# Patient Record
Sex: FEMALE | Race: WHITE | Employment: OTHER | URBAN - NONMETROPOLITAN AREA
[De-identification: names, ages, dates, MRNs, and addresses within clinical notes are randomized per-mention and may not be internally consistent; named-entity substitution may affect disease eponyms.]

---

## 2018-03-26 ENCOUNTER — OFFICE VISIT (OUTPATIENT)
Dept: ENT CLINIC | Age: 83
End: 2018-03-26
Payer: MEDICARE

## 2018-03-26 VITALS
WEIGHT: 150 LBS | SYSTOLIC BLOOD PRESSURE: 130 MMHG | HEART RATE: 63 BPM | HEIGHT: 63 IN | DIASTOLIC BLOOD PRESSURE: 80 MMHG | OXYGEN SATURATION: 99 % | BODY MASS INDEX: 26.58 KG/M2

## 2018-03-26 DIAGNOSIS — H91.93 BILATERAL HEARING LOSS, UNSPECIFIED HEARING LOSS TYPE: ICD-10-CM

## 2018-03-26 DIAGNOSIS — H61.23 IMPACTED CERUMEN, BILATERAL: Primary | ICD-10-CM

## 2018-03-26 PROCEDURE — 69210 REMOVE IMPACTED EAR WAX UNI: CPT | Performed by: OTOLARYNGOLOGY

## 2018-03-26 ASSESSMENT — ENCOUNTER SYMPTOMS
EYES NEGATIVE: 1
ABDOMINAL PAIN: 0
GASTROINTESTINAL NEGATIVE: 1
COLOR CHANGE: 0
RESPIRATORY NEGATIVE: 1
SHORTNESS OF BREATH: 0

## 2018-06-04 ENCOUNTER — HOSPITAL ENCOUNTER (OUTPATIENT)
Age: 83
Discharge: HOME | End: 2018-06-06
Payer: MEDICARE

## 2018-06-04 ENCOUNTER — HOSPITAL ENCOUNTER (OUTPATIENT)
Dept: GENERAL RADIOLOGY | Age: 83
Discharge: HOME | End: 2018-06-06
Payer: MEDICARE

## 2018-06-04 DIAGNOSIS — R06.02 SHORTNESS OF BREATH: ICD-10-CM

## 2018-06-04 PROCEDURE — 71046 X-RAY EXAM CHEST 2 VIEWS: CPT

## 2018-07-24 ENCOUNTER — APPOINTMENT (OUTPATIENT)
Dept: CT IMAGING | Age: 83
DRG: 565 | End: 2018-07-24
Payer: MEDICARE

## 2018-07-24 ENCOUNTER — APPOINTMENT (OUTPATIENT)
Dept: GENERAL RADIOLOGY | Age: 83
DRG: 565 | End: 2018-07-24
Payer: MEDICARE

## 2018-07-24 ENCOUNTER — HOSPITAL ENCOUNTER (INPATIENT)
Age: 83
LOS: 2 days | Discharge: HOME HEALTH | DRG: 565 | End: 2018-07-27
Attending: EMERGENCY MEDICINE | Admitting: GENERAL PRACTICE
Payer: MEDICARE

## 2018-07-24 DIAGNOSIS — S09.90XA CLOSED HEAD INJURY, INITIAL ENCOUNTER: ICD-10-CM

## 2018-07-24 DIAGNOSIS — R77.8 ELEVATED TROPONIN: ICD-10-CM

## 2018-07-24 DIAGNOSIS — S20.20XA MULTIPLE CONTUSIONS OF TRUNK, INITIAL ENCOUNTER: Primary | ICD-10-CM

## 2018-07-24 LAB
ANION GAP SERPL CALCULATED.3IONS-SCNC: 14 MMOL/L (ref 7–16)
APTT: 27.8 SEC (ref 24.5–35.1)
BACTERIA: ABNORMAL /HPF
BASOPHILS ABSOLUTE: 0.03 E9/L (ref 0–0.2)
BASOPHILS RELATIVE PERCENT: 0.2 % (ref 0–2)
BILIRUBIN URINE: NEGATIVE
BLOOD, URINE: NEGATIVE
BUN BLDV-MCNC: 20 MG/DL (ref 8–23)
CALCIUM SERPL-MCNC: 9.8 MG/DL (ref 8.6–10.2)
CHLORIDE BLD-SCNC: 82 MMOL/L (ref 98–107)
CLARITY: ABNORMAL
CO2: 29 MMOL/L (ref 22–29)
COLOR: YELLOW
CREAT SERPL-MCNC: 1.1 MG/DL (ref 0.5–1)
EKG ATRIAL RATE: 89 BPM
EKG P AXIS: 75 DEGREES
EKG P-R INTERVAL: 204 MS
EKG Q-T INTERVAL: 348 MS
EKG QRS DURATION: 74 MS
EKG QTC CALCULATION (BAZETT): 423 MS
EKG R AXIS: 8 DEGREES
EKG T AXIS: 26 DEGREES
EKG VENTRICULAR RATE: 89 BPM
EOSINOPHILS ABSOLUTE: 0.03 E9/L (ref 0.05–0.5)
EOSINOPHILS RELATIVE PERCENT: 0.2 % (ref 0–6)
GFR AFRICAN AMERICAN: 56
GFR NON-AFRICAN AMERICAN: 47 ML/MIN/1.73
GLUCOSE BLD-MCNC: 155 MG/DL (ref 74–109)
GLUCOSE URINE: NEGATIVE MG/DL
HCT VFR BLD CALC: 33.2 % (ref 34–48)
HEMOGLOBIN: 12 G/DL (ref 11.5–15.5)
IMMATURE GRANULOCYTES #: 0.04 E9/L
IMMATURE GRANULOCYTES %: 0.3 % (ref 0–5)
INR BLD: 1.1
KETONES, URINE: NEGATIVE MG/DL
LEUKOCYTE ESTERASE, URINE: NEGATIVE
LYMPHOCYTES ABSOLUTE: 0.83 E9/L (ref 1.5–4)
LYMPHOCYTES RELATIVE PERCENT: 6.7 % (ref 20–42)
MCH RBC QN AUTO: 32.4 PG (ref 26–35)
MCHC RBC AUTO-ENTMCNC: 36.1 % (ref 32–34.5)
MCV RBC AUTO: 89.7 FL (ref 80–99.9)
MONOCYTES ABSOLUTE: 0.77 E9/L (ref 0.1–0.95)
MONOCYTES RELATIVE PERCENT: 6.2 % (ref 2–12)
NEUTROPHILS ABSOLUTE: 10.71 E9/L (ref 1.8–7.3)
NEUTROPHILS RELATIVE PERCENT: 86.4 % (ref 43–80)
NITRITE, URINE: POSITIVE
PDW BLD-RTO: 12.5 FL (ref 11.5–15)
PH UA: 6.5 (ref 5–9)
PLATELET # BLD: 197 E9/L (ref 130–450)
PMV BLD AUTO: 9.1 FL (ref 7–12)
POTASSIUM SERPL-SCNC: 3.9 MMOL/L (ref 3.5–5)
PROTEIN UA: NEGATIVE MG/DL
PROTHROMBIN TIME: 12.5 SEC (ref 9.3–12.4)
RBC # BLD: 3.7 E12/L (ref 3.5–5.5)
RBC UA: ABNORMAL /HPF (ref 0–2)
SODIUM BLD-SCNC: 125 MMOL/L (ref 132–146)
SPECIFIC GRAVITY UA: 1.01 (ref 1–1.03)
TOTAL CK: 265 U/L (ref 20–180)
TROPONIN: 0.08 NG/ML (ref 0–0.03)
UROBILINOGEN, URINE: 0.2 E.U./DL
WBC # BLD: 12.4 E9/L (ref 4.5–11.5)
WBC UA: ABNORMAL /HPF (ref 0–5)

## 2018-07-24 PROCEDURE — 71045 X-RAY EXAM CHEST 1 VIEW: CPT

## 2018-07-24 PROCEDURE — 85730 THROMBOPLASTIN TIME PARTIAL: CPT

## 2018-07-24 PROCEDURE — 80048 BASIC METABOLIC PNL TOTAL CA: CPT

## 2018-07-24 PROCEDURE — 85025 COMPLETE CBC W/AUTO DIFF WBC: CPT

## 2018-07-24 PROCEDURE — 2580000003 HC RX 258: Performed by: EMERGENCY MEDICINE

## 2018-07-24 PROCEDURE — 93005 ELECTROCARDIOGRAM TRACING: CPT | Performed by: EMERGENCY MEDICINE

## 2018-07-24 PROCEDURE — 85610 PROTHROMBIN TIME: CPT

## 2018-07-24 PROCEDURE — 6360000002 HC RX W HCPCS: Performed by: EMERGENCY MEDICINE

## 2018-07-24 PROCEDURE — 82550 ASSAY OF CK (CPK): CPT

## 2018-07-24 PROCEDURE — 70450 CT HEAD/BRAIN W/O DYE: CPT

## 2018-07-24 PROCEDURE — 81001 URINALYSIS AUTO W/SCOPE: CPT

## 2018-07-24 PROCEDURE — 90715 TDAP VACCINE 7 YRS/> IM: CPT | Performed by: EMERGENCY MEDICINE

## 2018-07-24 PROCEDURE — 6370000000 HC RX 637 (ALT 250 FOR IP): Performed by: EMERGENCY MEDICINE

## 2018-07-24 PROCEDURE — 84484 ASSAY OF TROPONIN QUANT: CPT

## 2018-07-24 PROCEDURE — 90471 IMMUNIZATION ADMIN: CPT | Performed by: EMERGENCY MEDICINE

## 2018-07-24 PROCEDURE — 99285 EMERGENCY DEPT VISIT HI MDM: CPT

## 2018-07-24 RX ORDER — SODIUM CHLORIDE 9 MG/ML
500 INJECTION, SOLUTION INTRAVENOUS CONTINUOUS
Status: DISCONTINUED | OUTPATIENT
Start: 2018-07-24 | End: 2018-07-25

## 2018-07-24 RX ORDER — ACETAMINOPHEN 500 MG
1000 TABLET ORAL ONCE
Status: COMPLETED | OUTPATIENT
Start: 2018-07-24 | End: 2018-07-24

## 2018-07-24 RX ADMIN — ACETAMINOPHEN 1000 MG: 500 TABLET ORAL at 22:08

## 2018-07-24 RX ADMIN — SODIUM CHLORIDE 500 ML: 9 INJECTION, SOLUTION INTRAVENOUS at 20:40

## 2018-07-24 RX ADMIN — ACETAMINOPHEN 1000 MG: 500 TABLET ORAL at 20:43

## 2018-07-24 RX ADMIN — TETANUS TOXOID, REDUCED DIPHTHERIA TOXOID AND ACELLULAR PERTUSSIS VACCINE, ADSORBED 0.5 ML: 5; 2.5; 8; 8; 2.5 SUSPENSION INTRAMUSCULAR at 22:09

## 2018-07-24 ASSESSMENT — PAIN SCALES - GENERAL
PAINLEVEL_OUTOF10: 2
PAINLEVEL_OUTOF10: 1

## 2018-07-25 PROBLEM — S20.20XA CONTUSION OF THORAX: Status: ACTIVE | Noted: 2018-07-25

## 2018-07-25 LAB
ANION GAP SERPL CALCULATED.3IONS-SCNC: 11 MMOL/L (ref 7–16)
BASOPHILS ABSOLUTE: 0.02 E9/L (ref 0–0.2)
BASOPHILS RELATIVE PERCENT: 0.4 % (ref 0–2)
BUN BLDV-MCNC: 21 MG/DL (ref 8–23)
CALCIUM SERPL-MCNC: 9 MG/DL (ref 8.6–10.2)
CHLORIDE BLD-SCNC: 88 MMOL/L (ref 98–107)
CK MB: 14.5 NG/ML (ref 0–4.3)
CO2: 27 MMOL/L (ref 22–29)
CREAT SERPL-MCNC: 1.1 MG/DL (ref 0.5–1)
EOSINOPHILS ABSOLUTE: 0.07 E9/L (ref 0.05–0.5)
EOSINOPHILS RELATIVE PERCENT: 1.3 % (ref 0–6)
GFR AFRICAN AMERICAN: 56
GFR NON-AFRICAN AMERICAN: 47 ML/MIN/1.73
GLUCOSE BLD-MCNC: 92 MG/DL (ref 74–109)
HCT VFR BLD CALC: 29.3 % (ref 34–48)
HEMOGLOBIN: 10.5 G/DL (ref 11.5–15.5)
IMMATURE GRANULOCYTES #: 0.01 E9/L
IMMATURE GRANULOCYTES %: 0.2 % (ref 0–5)
LYMPHOCYTES ABSOLUTE: 1.12 E9/L (ref 1.5–4)
LYMPHOCYTES RELATIVE PERCENT: 20.4 % (ref 20–42)
MAGNESIUM: 1.6 MG/DL (ref 1.6–2.6)
MCH RBC QN AUTO: 32 PG (ref 26–35)
MCHC RBC AUTO-ENTMCNC: 35.8 % (ref 32–34.5)
MCV RBC AUTO: 89.3 FL (ref 80–99.9)
MONOCYTES ABSOLUTE: 0.64 E9/L (ref 0.1–0.95)
MONOCYTES RELATIVE PERCENT: 11.7 % (ref 2–12)
NEUTROPHILS ABSOLUTE: 3.63 E9/L (ref 1.8–7.3)
NEUTROPHILS RELATIVE PERCENT: 66 % (ref 43–80)
PDW BLD-RTO: 12.5 FL (ref 11.5–15)
PLATELET # BLD: 169 E9/L (ref 130–450)
PMV BLD AUTO: 9.2 FL (ref 7–12)
POTASSIUM SERPL-SCNC: 3.4 MMOL/L (ref 3.5–5)
RBC # BLD: 3.28 E12/L (ref 3.5–5.5)
SODIUM BLD-SCNC: 126 MMOL/L (ref 132–146)
TOTAL CK: 309 U/L (ref 20–180)
TROPONIN: 0.06 NG/ML (ref 0–0.03)
WBC # BLD: 5.5 E9/L (ref 4.5–11.5)

## 2018-07-25 PROCEDURE — 6370000000 HC RX 637 (ALT 250 FOR IP): Performed by: INTERNAL MEDICINE

## 2018-07-25 PROCEDURE — 83735 ASSAY OF MAGNESIUM: CPT

## 2018-07-25 PROCEDURE — 97161 PT EVAL LOW COMPLEX 20 MIN: CPT

## 2018-07-25 PROCEDURE — 85025 COMPLETE CBC W/AUTO DIFF WBC: CPT

## 2018-07-25 PROCEDURE — 2580000003 HC RX 258: Performed by: INTERNAL MEDICINE

## 2018-07-25 PROCEDURE — G8988 SELF CARE GOAL STATUS: HCPCS

## 2018-07-25 PROCEDURE — 82553 CREATINE MB FRACTION: CPT

## 2018-07-25 PROCEDURE — 82550 ASSAY OF CK (CPK): CPT

## 2018-07-25 PROCEDURE — G8987 SELF CARE CURRENT STATUS: HCPCS

## 2018-07-25 PROCEDURE — 97165 OT EVAL LOW COMPLEX 30 MIN: CPT

## 2018-07-25 PROCEDURE — 93308 TTE F-UP OR LMTD: CPT

## 2018-07-25 PROCEDURE — 80048 BASIC METABOLIC PNL TOTAL CA: CPT

## 2018-07-25 PROCEDURE — 6360000002 HC RX W HCPCS: Performed by: NURSE PRACTITIONER

## 2018-07-25 PROCEDURE — 1200000000 HC SEMI PRIVATE

## 2018-07-25 PROCEDURE — 6370000000 HC RX 637 (ALT 250 FOR IP): Performed by: NURSE PRACTITIONER

## 2018-07-25 PROCEDURE — APPSS60 APP SPLIT SHARED TIME 46-60 MINUTES: Performed by: NURSE PRACTITIONER

## 2018-07-25 PROCEDURE — 36415 COLL VENOUS BLD VENIPUNCTURE: CPT

## 2018-07-25 PROCEDURE — 84484 ASSAY OF TROPONIN QUANT: CPT

## 2018-07-25 PROCEDURE — 99222 1ST HOSP IP/OBS MODERATE 55: CPT | Performed by: INTERNAL MEDICINE

## 2018-07-25 RX ORDER — SIMVASTATIN 40 MG
80 TABLET ORAL DAILY
Status: DISCONTINUED | OUTPATIENT
Start: 2018-07-25 | End: 2018-07-27 | Stop reason: HOSPADM

## 2018-07-25 RX ORDER — ESTRADIOL 0.5 MG/1
1 TABLET ORAL DAILY
Status: DISCONTINUED | OUTPATIENT
Start: 2018-07-25 | End: 2018-07-27 | Stop reason: HOSPADM

## 2018-07-25 RX ORDER — MAGNESIUM SULFATE IN WATER 40 MG/ML
2 INJECTION, SOLUTION INTRAVENOUS ONCE
Status: COMPLETED | OUTPATIENT
Start: 2018-07-25 | End: 2018-07-25

## 2018-07-25 RX ORDER — GABAPENTIN 300 MG/1
300 CAPSULE ORAL 2 TIMES DAILY
Status: DISCONTINUED | OUTPATIENT
Start: 2018-07-25 | End: 2018-07-27 | Stop reason: HOSPADM

## 2018-07-25 RX ORDER — AMITRIPTYLINE HYDROCHLORIDE 25 MG/1
25 TABLET, FILM COATED ORAL NIGHTLY
Status: DISCONTINUED | OUTPATIENT
Start: 2018-07-25 | End: 2018-07-27 | Stop reason: HOSPADM

## 2018-07-25 RX ORDER — LISINOPRIL AND HYDROCHLOROTHIAZIDE 25; 20 MG/1; MG/1
1 TABLET ORAL DAILY
Status: DISCONTINUED | OUTPATIENT
Start: 2018-07-25 | End: 2018-07-25 | Stop reason: CLARIF

## 2018-07-25 RX ORDER — SODIUM CHLORIDE 0.9 % (FLUSH) 0.9 %
10 SYRINGE (ML) INJECTION EVERY 12 HOURS SCHEDULED
Status: DISCONTINUED | OUTPATIENT
Start: 2018-07-25 | End: 2018-07-27 | Stop reason: HOSPADM

## 2018-07-25 RX ORDER — POTASSIUM CHLORIDE 20 MEQ/1
40 TABLET, EXTENDED RELEASE ORAL ONCE
Status: COMPLETED | OUTPATIENT
Start: 2018-07-25 | End: 2018-07-25

## 2018-07-25 RX ORDER — HYDROCHLOROTHIAZIDE 25 MG/1
25 TABLET ORAL DAILY
Status: DISCONTINUED | OUTPATIENT
Start: 2018-07-25 | End: 2018-07-25

## 2018-07-25 RX ORDER — COVID-19 ANTIGEN TEST
1 KIT MISCELLANEOUS 2 TIMES DAILY PRN
Status: DISCONTINUED | OUTPATIENT
Start: 2018-07-25 | End: 2018-07-25 | Stop reason: CLARIF

## 2018-07-25 RX ORDER — FUROSEMIDE 20 MG/1
20 TABLET ORAL DAILY
Status: DISCONTINUED | OUTPATIENT
Start: 2018-07-25 | End: 2018-07-25

## 2018-07-25 RX ORDER — NAPROXEN 250 MG/1
250 TABLET ORAL 2 TIMES DAILY PRN
Status: DISCONTINUED | OUTPATIENT
Start: 2018-07-25 | End: 2018-07-26

## 2018-07-25 RX ORDER — OMEPRAZOLE 20 MG/1
40 CAPSULE, DELAYED RELEASE ORAL DAILY
Status: DISCONTINUED | OUTPATIENT
Start: 2018-07-25 | End: 2018-07-25 | Stop reason: CLARIF

## 2018-07-25 RX ORDER — LISINOPRIL 20 MG/1
20 TABLET ORAL DAILY
Status: DISCONTINUED | OUTPATIENT
Start: 2018-07-25 | End: 2018-07-26

## 2018-07-25 RX ORDER — ACETAMINOPHEN 325 MG/1
650 TABLET ORAL EVERY 4 HOURS PRN
Status: DISCONTINUED | OUTPATIENT
Start: 2018-07-25 | End: 2018-07-27 | Stop reason: HOSPADM

## 2018-07-25 RX ORDER — PANTOPRAZOLE SODIUM 40 MG/1
40 TABLET, DELAYED RELEASE ORAL
Status: DISCONTINUED | OUTPATIENT
Start: 2018-07-25 | End: 2018-07-27 | Stop reason: HOSPADM

## 2018-07-25 RX ORDER — SODIUM CHLORIDE 0.9 % (FLUSH) 0.9 %
10 SYRINGE (ML) INJECTION PRN
Status: DISCONTINUED | OUTPATIENT
Start: 2018-07-25 | End: 2018-07-27 | Stop reason: HOSPADM

## 2018-07-25 RX ADMIN — PANTOPRAZOLE SODIUM 40 MG: 40 TABLET, DELAYED RELEASE ORAL at 06:58

## 2018-07-25 RX ADMIN — Medication 10 ML: at 08:45

## 2018-07-25 RX ADMIN — GABAPENTIN 300 MG: 300 CAPSULE ORAL at 20:25

## 2018-07-25 RX ADMIN — ESTRADIOL 1 MG: 0.5 TABLET ORAL at 08:44

## 2018-07-25 RX ADMIN — GABAPENTIN 300 MG: 300 CAPSULE ORAL at 08:44

## 2018-07-25 RX ADMIN — POTASSIUM CHLORIDE 40 MEQ: 20 TABLET, EXTENDED RELEASE ORAL at 08:44

## 2018-07-25 RX ADMIN — Medication 10 ML: at 20:26

## 2018-07-25 RX ADMIN — SIMVASTATIN 80 MG: 40 TABLET, FILM COATED ORAL at 20:25

## 2018-07-25 RX ADMIN — LISINOPRIL 20 MG: 20 TABLET ORAL at 08:44

## 2018-07-25 RX ADMIN — AMITRIPTYLINE HYDROCHLORIDE 25 MG: 25 TABLET, FILM COATED ORAL at 20:25

## 2018-07-25 RX ADMIN — MAGNESIUM SULFATE HEPTAHYDRATE 2 G: 40 INJECTION, SOLUTION INTRAVENOUS at 08:48

## 2018-07-25 RX ADMIN — HYDROCHLOROTHIAZIDE 25 MG: 25 TABLET ORAL at 08:44

## 2018-07-25 ASSESSMENT — PAIN SCALES - GENERAL
PAINLEVEL_OUTOF10: 0

## 2018-07-25 NOTE — PROGRESS NOTES
[facility] Quality Flow/Interdisciplinary Rounds Progress Note        Quality Flow Rounds held on July 25, 2018    Disciplines Attending:  Bedside Nurse, ,  and Nursing Unit Leadership    Helga Krishna was admitted on 7/24/2018  7:09 PM    Anticipated Discharge Date:  Expected Discharge Date: 07/26/18    Disposition:    Merritt Score:  Merritt Scale Score: 19    Readmission Risk              Risk of Unplanned Readmission:        7             Discussed patient goal for the day, patient clinical progression, and barriers to discharge.   The following Goal(s) of the Day/Commitment(s) have been identified:  Check cardio plan      Feliberto [place]  July 25, 2018

## 2018-07-25 NOTE — CARE COORDINATION
Social work / Discharge Planning:       Social work met with patient and her daughter at bedside for initial assessment. She lives alone in a one floor home but goes to the basement to do laundry. She uses a cane and walker as needed. Patient had home care in the past but cannot recall which one and has no history of ADARSH. AM PAC 17/24. Social work discussed the option of home care which she declines. Patient states that she plans to allow her daughter to do her laundry to eliminate the need for her to go to the basement. They denied having any discharge needs.   Electronically signed by TRISTIAN Fraga on 7/25/2018 at 1:54 PM

## 2018-07-25 NOTE — ED NOTES
Pt. Admission initiated. SBAR faxed to floor 6S. Floor called, spoke with ROSELINE Landeros, fax verified. Awaiting transport with tele, will continue to monitor pt.       Jermaine Araiza RN  07/25/18 [id number]

## 2018-07-25 NOTE — PROGRESS NOTES
her house. Patient education  Pt educated on walker safety    Patient response to education:   Pt verbalized understanding Pt demonstrated skill Pt requires further education in this area   yes With cues yes     Rehab potential is good for reaching above PT goals. Pts/ family goals   1. To return home    Patient and or family understand(s) diagnosis, prognosis, and plan of care. PLAN  PT care will be provided in accordance with the objectives noted above. Whenever appropriate, clear delegation orders will be provided for nursing staff. Exercises and functional mobility practice will be used as well as appropriate assistive devices or modalities to obtain goals. Patient and family education will also be administered as needed. Frequency of treatments will be 2-5x/week x 5 days.     Юлия PT  [id number]

## 2018-07-25 NOTE — ED NOTES
Alterations in skin    Location: coccyx    Location description: upper          Wound description: Redness, skin tear, bruising      Alterations in skin    Location: elbow    Location description: left        Wound description: Skin tear from fall.      Jose Pollock RN  07/24/18 [address] [place] [address] Escobar Ventura RN  07/25/18 [id number]

## 2018-07-25 NOTE — H&P
History and Physical      CHIEF COMPLAINT:  Mechanical fall      HISTORY OF PRESENT ILLNESS:      The patient is a 80 y.o. female patient of Dr. Quintin Glover who presents with a mechanical fall from home. She was in her basement doing laundry. When she came up the steps she missed the landing, lost her balance and fell backward down the steps and hit her head. She denies any loss of consciousness. She was on the ground for approximately one hour. She called her daughter and paramedics were called and brought her to the emergency room. She admits to having an unsteady gait. She denies any chest pain. She denies any palpitations or lightheadedness or vertigo. She admits to having chronic exertional dyspnea which is unchanged. She states she had a contusion on the back of her head however this is improved. Past Medical History:    Past Medical History:   Diagnosis Date    GERD (gastroesophageal reflux disease)     Hyperlipidemia     Hypertension        Past Surgical History:    Past Surgical History:   Procedure Laterality Date    BLADDER REPAIR      BREAST LUMPECTOMY      SKIN CANCER EXCISION      head/face areas       Medications Prior to Admission:    Prescriptions Prior to Admission: linaclotide (LINZESS) 145 MCG capsule, Take 145 mcg by mouth every morning (before breakfast)  Naproxen Sodium (ALEVE) 220 MG CAPS, Take by mouth 2 times daily  furosemide (LASIX) 20 MG tablet, Take 20 mg by mouth daily  lisinopril-hydrochlorothiazide (PRINZIDE;ZESTORETIC) 20-25 MG per tablet, Take 1 tablet by mouth daily. estradiol (ESTRACE) 1 MG tablet, Take 1 mg by mouth daily. simvastatin (ZOCOR) 40 MG tablet, Take 80 mg by mouth daily   gabapentin (NEURONTIN) 300 MG capsule, Take 300 mg by mouth 2 times daily. omeprazole (PRILOSEC) 20 MG capsule, Take 40 mg by mouth daily. amitriptyline (ELAVIL) 25 MG tablet, Take 25 mg by mouth nightly.       Allergies:    Codeine    Social History:    reports that she has quit smoking. Her smoking use included Cigarettes. She quit after 2.00 years of use. She has never used smokeless tobacco. She reports that she does not drink alcohol or use drugs. Family History: Mother  of breast cancer    REVIEW OF SYSTEMS    Constitutional: negative for chills, fevers and weight loss  Eyes: negative for icterus and redness  Ears, nose, mouth, throat, and face: negative for epistaxis, hearing loss, nasal congestion, sore mouth, sore throat and tinnitus  Respiratory: negative for cough and hemoptysis  Cardiovascular: negative for chest pain, chest pressure/discomfort, exertional chest pressure/discomfort, irregular heart beat, lower extremity edema, near-syncope, palpitations, paroxysmal nocturnal dyspnea and syncope  Gastrointestinal: negative for abdominal pain and vomiting  Genitourinary:negative for dysuria and frequency  Integument/breast: negative for pruritus and rash  Hematologic/lymphatic: negative for bleeding and easy bruising  Musculoskeletal:negative for arthralgias and muscle weakness  Neurological: negative for coordination problems and dizziness  Behavioral/Psych: negative for anxiety and depression  Endocrine: negative for temperature intolerance  Allergic/Immunologic: negative for anaphylaxis and angioedema    PHYSICAL EXAM:    Vitals:  BP (!) 143/65   Pulse 72   Temp 98 °F (36.7 °C) (Oral)   Resp 14   Ht 5' 5\" (1.651 m)   Wt 138 lb 6.4 oz (62.8 kg)   SpO2 97%   BMI 23.03 kg/m²     General appearance: alert, appears stated age and cooperative  Head: Normocephalic, without obvious abnormality, atraumatic  Eyes: conjunctivae/corneas clear. PERRL, EOM's intact. Fundi benign. Ears: normal TM's and external ear canals both ears  Nose: Nares normal. Septum midline. Mucosa normal. No drainage or sinus tenderness.   Throat: lips, mucosa, and tongue normal; teeth and gums normal  Neck: no adenopathy, no carotid bruit, no JVD, supple, symmetrical, trachea midline and thyroid Range 2018 20:35   Prothrombin Time Latest Ref Range: 9.3 - 12.4 sec 12.5 (H)   INR Unknown 1.1   aPTT Latest Ref Range: 24.5 - 35.1 sec 27.8     Narrative   Patient MRN:  [id number]   : 3/13/1927   Age: 80 years   Gender: Female       Order Date:  2018 11:15 PM               TECHNIQUE/NUMBER OF IMAGES/COMPARISON/CLINICAL HISTORY:       Chest AP       1 imaging, one view       Comparison .       Fall trauma injury.       FINDINGS: There is a large-sized hiatal hernia projected the right   lung. Some areas of chronic atelectasis in the right base.       Cardiac area has upper borderline size.       No pneumothorax identified. No signs for acute pulmonary contusion. Some discrete atelectasis are also seen in the left base.  There is no   perihilar vascular congestion.           Impression   No acute cardiopulmonary process       2018  9:01 PM - Fabiano, Mhy Incoming Ekg Results From [facility]     Component Results     Component Value Ref Range & Units Status Collected Lab   Ventricular Rate 89  BPM Incomplete 2018  7:19 PM [facility]   Atrial Rate 89  BPM Incomplete 2018  7:19 PM [facility]   P-R Interval 204  ms Incomplete 2018  7:19 PM [facility]   QRS Duration 74  ms Incomplete 2018  7:19 PM [facility]   Q-T Interval 348  ms Incomplete 2018  7:19 PM [facility]   QTc Calculation (Bazett) 423  ms Incomplete 2018  7:19 PM [facility]   P [place] 75  degrees Incomplete 2018  7:19 PM [facility]   R Axis 8  degrees Incomplete 2018  7:19 PM [facility]   T [place] 26  degrees Incomplete 2018  7:19 PM [facility]   Testing Performed By     Lab - [address] Name Director Address Valid Date Range   360-[facility] MUSE Unknown Unknown 16 1121-Present   Narrative     Normal sinus rhythm  Normal ECG  No previous ECGs available     Narrative   EXAM:     CT Head Without Intravenous Contrast       EXAM DATE/TIME:     2018 9:30 PM       CLINICAL HISTORY:     80 years old, female; Injury or trauma; Fall; Initial encounter; Sprain or    strain and swelling (edema); Additional info: Fall, head injury       TECHNIQUE:     Axial computed tomography images of the head/brain without intravenous    contrast.  All CT scans at this facility use at least one of these dose    optimization techniques: automated exposure control; mA and/or kV adjustment    per patient size (includes targeted exams where dose is matched to clinical    indication); or iterative reconstruction.       COMPARISON:     No relevant prior studies available.       FINDINGS:     Brain:  Brain atrophy with hypodensities in the periventricular/deep white    matter that suggest chronic microvascular ischemia.  No hemorrhage.     Ventricles: Monica Chiles.  No hydrocephalus.     Bones/joints:  Unremarkable.  No acute fracture.     Soft tissues:  Scalp hematoma at the vertex.     Vasculature:  Vascular calcifications.     Sinuses:  Unremarkable as visualized.  No acute sinusitis.     Mastoid air cells:  Unremarkable as visualized.  No mastoid effusion.           Impression     No acute intracranial abnormality.       This report has been electronically signed by Maricarmen Torres MD.           Problem list:    Patient Active Problem List   Diagnosis    Elevated troponin    Multiple contusions of trunk, initial encounter         ASSESSMENT:      1. Mechanical fall    2. Traumatic rhabdomyolysis secondary to mechanical fall with rlevated troponin, being treated with intravenous fluids    3. Hyponatremia and hypokalemia, likely diuretic induced    4. Essential hypertension    5. Hyperlipidemia      PLAN:     1. Cardiology evaluation progress    2. Discontinue diuretics    3. Echocardiogram for LV function    4. Continue lisinopril    5.  Continue IV fluids    Everardo Sicard D.O., JAMSHID  9:18 AM  7/25/2018

## 2018-07-25 NOTE — PLAN OF CARE
Problem: Falls - Risk of:  Goal: Will remain free from falls  Will remain free from falls   Outcome: Met This Shift      Problem: ABCDS Injury Assessment  Goal: Absence of physical injury  Outcome: Met This Shift      Problem: Cardiac Output - Decreased:  Goal: Hemodynamic stability will improve  Hemodynamic stability will improve   Outcome: Met This Shift      Problem: Skin Integrity:  Goal: Will show no infection signs and symptoms  Will show no infection signs and symptoms   Outcome: Met This Shift

## 2018-07-25 NOTE — CONSULTS
Inpatient Cardiology Consultation      Reason for Consult:  Elevated troponin    Consulting Physician: Dr. Erica Mccullough    Requesting Physician:  Dr. Connor Lanza    Date of Consultation: 7/25/2018    HISTORY OF PRESENT ILLNESS: Ms. Bindu Baer is a 80year old female who is previously not known to [facility]. PMH: HTN, HLD, GERD, [place], chronic back pain, Ex-smoker, and recurrent mechanical falls. Prior to Ms. Devi presenting to [facility] on 7/24/2018 she reports having a vague \"pain\" in her lower back for years that limits her activity in the morning. She reports needing to \"move slowly in the morning. \" Shortly after waking up she was able to do her normal activities around the house. In the evening she proceeded to go down ~10-12 steps into the basement to wash clothes. When she was walking back up the steps she got to the top and used the door knob to \"pull herself\" up to the next step and she lost her balance falling backwards down 10-12 steps and landing on the ground hitting the back of her head. She denies any precipitating symptoms prior to the fall and she remembers hitting the ground with no LOC. Denies CP, palpitations, dizziness, or feeling lightheaded. She was down on the ground for ~ 1 hour and was unable to get herself up. She called her daughter and EMS was summoned. She reports having Chronic QUEZADA walking up/down steps and walking > 75 ft (no worse over the past 2 years). Upon arrival to the ED: Labs included Na+ 125, K+ 3.9, BUN/Cr 20/1.1, troponin 0.08, Total , (second troponin 0.06), WBC 12.4, H/H stable. UA: + Nitrites. CT Head: No acute process. CXR: No Acute process. Medications in the ED: Tylenol, Tetanus injection, NaCl IV fluids. She was admitted to a telemetry monitored unit. Cardiology was consulted for elevated troponin. Upon initial consultation, patient is sitting up in bed and appears to be in no acute Distress.  Reports having \"soreness\" in the back of her head Current Facility-Administered Medications: sodium chloride flush 0.9 % injection 10 mL, 10 mL, Intravenous, 2 times per day  sodium chloride flush 0.9 % injection 10 mL, 10 mL, Intravenous, PRN  acetaminophen (TYLENOL) tablet 650 mg, 650 mg, Oral, Q4H PRN  amitriptyline (ELAVIL) tablet 25 mg, 25 mg, Oral, Nightly  estradiol (ESTRACE) tablet 1 mg, 1 mg, Oral, Daily  furosemide (LASIX) tablet 20 mg, 20 mg, Oral, Daily  gabapentin (NEURONTIN) capsule 300 mg, 300 mg, Oral, BID  linaclotide (LINZESS) capsule 145 mcg, 145 mcg, Oral, QAM AC  Naproxen Sodium CAPS 1 capsule, 1 capsule, Oral, BID PRN  simvastatin (ZOCOR) tablet 80 mg, 80 mg, Oral, Daily  lisinopril (PRINIVIL;ZESTRIL) tablet 20 mg, 20 mg, Oral, Daily **AND** hydrochlorothiazide (HYDRODIURIL) tablet 25 mg, 25 mg, Oral, Daily  pantoprazole (PROTONIX) tablet 40 mg, 40 mg, Oral, QAM AC  0.9 % sodium chloride infusion, 500 mL, Intravenous, Continuous    Allergies:  Codeine    Social History: Former smoker: quit 20-30 years ago; 0.5 PPD for \"around 15 years\"  Denies alcohol and illicit drug use. Uses a walker. Lives alone in 2 story home with basement. Daughter comes to her house daily to help with cooking and cleaning. Drinks 1 cup of coffee per day and 1 cup of tea. Family History: Noncontributory due to advanced age. REVIEW OF SYSTEMS:     · Constitutional: + fatigue. Denies fevers, chills or night sweats. +[place]  · Eyes: Denies visual changes or drainage  · ENT: Denies headaches or hearing loss. No mouth sores or sore throat. No epistaxis   · Cardiovascular: Denies chest pain, pressure or palpitations. + \"occasional\" lower extremity swelling-wears MARY hoses. · Respiratory: + QUEZADA. Denies  cough, orthopnea or PND. No hemoptysis   · Gastrointestinal: Denies hematemesis or anorexia. No hematochezia or melena    · Genitourinary: Denies urgency, dysuria or hematuria.   · Musculoskeletal: Denies gait disturbance (uses a walker),+ generalized pain conjunctival erythema  ENMT: MMM, no rhinorrhea  Neck: Supple, no carotid bruits  Lungs: Clear to auscultation bilaterally. No wheezes, rales, or rhonchi. Cardiac: Regular rate and rhythm, 1/6 systolic murmur  Abdomen: Soft, +bowel sounds  Extremities: Moves all extremities x 4, trace lower extremity edema  Neurologic: No focal motor deficits apparent, normal mood and affect    Laboratory Tests:  Lab Results   Component Value Date    WBC 5.5 07/25/2018    HGB 10.5 (L) 07/25/2018    HCT 29.3 (L) 07/25/2018    MCV 89.3 07/25/2018     07/25/2018     Lab Results   Component Value Date    CREATININE 1.1 (H) 07/25/2018    BUN 21 07/25/2018     (L) 07/25/2018    K 3.4 (L) 07/25/2018    CL 88 (L) 07/25/2018    CO2 27 07/25/2018     Lab Results   Component Value Date    MG 1.6 07/25/2018     Lab Results   Component Value Date    CKTOTAL 309 (H) 07/25/2018    CKMB 14.5 (H) 07/25/2018    TROPONINI 0.06 (H) 07/25/2018    TROPONINI 0.08 (H) 07/24/2018     Telemetry findings reviewed: SR at rate 70's      Impression:  1. Presentation for mechanical fall (no syncope)  2. Elevated cardiac enzymes -- patient denies recent CP or worsening SOB, no acute STT changes on EKG  3. HTN  4. HLD  5. Hypokalemia / low magnesium  6. Prior tobacco abuse    Plan:  1. Echocardiogram  2. Replace electrolytes  3. No advanced cardiac work-up planned otherwise    Thank you for allowing me to participate in your patient's care. Please feel free to contact me if you have any questions or concerns.     Lisseth Naqvi MD  [facility]) Cardiology

## 2018-07-25 NOTE — ED PROVIDER NOTES
Department of Emergency Medicine   ED  Provider Note  Admit Date/RoomTime: 7/24/2018  7:09 PM  ED Room: 11/11      History of Present Illness:  7/24/18, Time: 9:28 PM        Nader Matias is a 80 y.o. female presenting to the ED for fall, beginning today. The complaint has been constant, moderate in severity, and worsened by nothing. Patient arrived in the ED after falling down 10 steps earlier today. Family states that the patient got to the top of the steps after doing laundry and fell. Family reports that the steps were wooden. Pt complains of pain in the back of her head and neck pain. Pt denies cough, congestion, fever, chills, N/V/D, HA, CP, SOB, abdominal pain, back pain, LOC, syncope, urinary symptoms, constipation, or any other symptoms. Review of Systems:   Pertinent positives and negatives are stated within HPI, all other systems reviewed and are negative.    --------------------------------------------- PAST HISTORY ---------------------------------------------  Past Medical History:  has a past medical history of GERD (gastroesophageal reflux disease); Hyperlipidemia; and Hypertension. Past Surgical History:  has a past surgical history that includes bladder repair; Breast lumpectomy; and Skin cancer excision. Social History:  reports that she has quit smoking. Her smoking use included Cigarettes. She quit after 2.00 years of use. She has never used smokeless tobacco. She reports that she does not drink alcohol or use drugs. Family History: Family history is unknown by patient. The patients home medications have been reviewed. Allergies: Codeine      ---------------------------------------------------PHYSICAL EXAM--------------------------------------    Constitutional/General: Alert and oriented x3, well appearing, non toxic in NAD  Head: hematoma to the back of the scalp, tenderness on palpation. Eyes: PERRL, EOMI.   Mouth: Oropharynx clear, mucous membranes moist. 0. 77 0.10 - 0.95 E9/L    Eosinophils # 0.03 (L) 0.05 - 0.50 E9/L    Basophils # 0.03 0.00 - 0.20 C6/T   Basic Metabolic Panel   Result Value Ref Range    Sodium 125 (L) 132 - 146 mmol/L    Potassium 3.9 3.5 - 5.0 mmol/L    Chloride 82 (L) 98 - 107 mmol/L    CO2 29 22 - 29 mmol/L    Anion Gap 14 7 - 16 mmol/L    Glucose 155 (H) 74 - 109 mg/dL    BUN 20 8 - 23 mg/dL    CREATININE 1.1 (H) 0.5 - 1.0 mg/dL    GFR Non-African American 47 >=60 mL/min/1.73    GFR African American 56     Calcium 9.8 8.6 - 10.2 mg/dL   Urinalysis   Result Value Ref Range    Color, UA Yellow Straw/Yellow    Clarity, UA SL CLOUDY Clear    Glucose, Ur Negative Negative mg/dL    Bilirubin Urine Negative Negative    Ketones, Urine Negative Negative mg/dL    Specific Gravity, UA 1.010 1.005 - 1.030    Blood, Urine Negative Negative    pH, UA 6.5 5.0 - 9.0    Protein, UA Negative Negative mg/dL    Urobilinogen, Urine 0.2 <2.0 E.U./dL    Nitrite, Urine POSITIVE (A) Negative    Leukocyte Esterase, Urine Negative Negative   CK   Result Value Ref Range    Total  (H) 20 - 180 U/L   Microscopic Urinalysis   Result Value Ref Range    WBC, UA NONE 0 - 5 /HPF    RBC, UA NONE 0 - 2 /HPF    Bacteria, UA MANY (A) /HPF   EKG 12 Lead   Result Value Ref Range    Ventricular Rate 89 BPM    Atrial Rate 89 BPM    P-R Interval 204 ms    QRS Duration 74 ms    Q-T Interval 348 ms    QTc Calculation (Bazett) 423 ms    P Axis 75 degrees    R Axis 8 degrees    T Axis 26 degrees       RADIOLOGY:  Interpreted by Radiologist.  XR CHEST PORTABLE   Final Result   No acute cardiopulmonary process      CT HEAD WO CONTRAST   Final Result     No acute intracranial abnormality. This report has been electronically signed by Tarah Jane MD.          EKG Interpretation    EKG: This EKG is signed and interpreted by the EP.     Time: 1919  Rate: 89 bpm  Rhythm: Sinus  Interpretation: non-specific EKG  Comparison: none      ------------------------- NURSING NOTES AND VITALS REVIEWED ---------------------------   The nursing notes within the ED encounter and vital signs as below have been reviewed by myself. /62   Pulse 72   Temp 97.6 °F (36.4 °C) (Oral)   Resp 16   Ht 5' 5\" (1.651 m)   Wt 150 lb (68 kg)   SpO2 98%   BMI 24.96 kg/m²   Oxygen Saturation Interpretation: Normal    The patients available past medical records and past encounters were reviewed. ------------------------------ ED COURSE/MEDICAL DECISION MAKING----------------------  Medications   0.9 % sodium chloride infusion (500 mLs Intravenous New Bag 7/24/18 2040)   acetaminophen (TYLENOL) tablet 1,000 mg (1,000 mg Oral Given 7/24/18 2043)   acetaminophen (TYLENOL) tablet 1,000 mg (1,000 mg Oral Given 7/24/18 2208)   Tetanus-Diphth-Acell Pertussis (BOOSTRIX) injection 0.5 mL (0.5 mLs Intramuscular Given 7/24/18 2209)       Medical Decision Making:   Differential Diagnoses:  Closed Head Injury, SAH, SDH, Cervical Fracture, Dehydration, UTI, Metabolic Disorder, to name a few. Re-Evaluations:           Re-evaluation. Patients symptoms are improving    Consultations:  Dr. Ministerio Mijares was consulted for admission to [facility]. Counseling: The emergency provider has spoken with the patient and family member patient and discussed todays results, in addition to providing specific details for the plan of care and counseling regarding the diagnosis and prognosis. Questions are answered at this time and they are agreeable with the plan.     --------------------------------- IMPRESSION AND DISPOSITION ---------------------------------    IMPRESSION  1. Multiple contusions of trunk, initial encounter    2. Closed head injury, initial encounter    3.  Elevated troponin        DISPOSITION  Disposition: Admit to telemetry  Patient condition is stable    7/24/18, 9:28 PM.    This note is prepared by Karely Faustin, acting as Scribe for Corrina Erwin MD.    Corrina Erwin MD:  The scribe's documentation has been prepared under my direction and personally reviewed by me in its entirety. I confirm that the note above accurately reflects all work, treatment, procedures, and medical decision making performed by me.              Jose Carlos Arriaza MD  07/24/18 [id number]

## 2018-07-25 NOTE — PROGRESS NOTES
Occupational Therapy  Occupational Therapy Initial Evaluation  Date: 2018  Patient Name: Deuce Cuello  MRN: [id number]  : 3/13/1927  Room #: 4854/9653-P    Evaluating OT: Goldie Dawson, OTR/L    Chart review completed and nursing cleared patient to participate in edge/out of bed activities; patient agreeable to participate in OT evaluation. Frequency / Duration: OT treatment to be provided 2-5x/week (M-F), as able/appropriate. Recommended AE/DME: Continue to assess. Diagnosis: Patient admitted with diagnosis of: Elevated troponin [R74.8]. Patient presented to hospital s/p fall at home. Patient's medical history includes: HTN, GERD, cancer. Diagnosis / Problem List:   1. Multiple contusions of trunk, initial encounter    2. Closed head injury, initial encounter    3. Elevated troponin       Patient Active Problem List   Diagnosis    Elevated troponin    Multiple contusions of trunk, initial encounter    Electrolyte abnormality    Essential hypertension    Mixed hyperlipidemia     Patient's Past Medical History:   Past Medical History:   Diagnosis Date    GERD (gastroesophageal reflux disease)     Hyperlipidemia     Hypertension      Patient's Surgical History:   Past Surgical History:   Procedure Laterality Date    BLADDER REPAIR      BREAST LUMPECTOMY      SKIN CANCER EXCISION      head/face areas       Precautions: falls risk; bed/chair alarms; hearing impairment     Home Setup: Patient reported that she lives alone in a one-floor home (one step to enter); patient's bedroom and bathroom are on the main living level. Patient has a tub only (with tub seat available). Patient noted that the laundry is in the basement of her home. Equipment (AE/DME/AD) Owned: walker; cane; tub seat    Prior Level of Function (PLOF): (per patient)   ADLs: Independent   IADLs: Independent (with light meal prep tasks and laundry);  Needed assistance (with most other IADLs)   Functional Transfers/Mobility: Independent (with use of cane most of the time) - patient noted that a walker will not fit well within all of the rooms of her home  Family/Caregiver Assistance Available: Patient indicated that her daughter assists with IADLs, as needed; patient noted that her daughter stops by daily to check on her. Patient stated that her daughter can assist with laundry, as needed. Social History: (per patient)   Driving: No    Pain Level: Patient reported feeling \"sore\"; patient did not rate her pain. Cognition: Patient alert and oriented grossly. Safety Awareness: Fair    Vision/Perception:   Hearing: Impaired  Vision: WFL grossly       Occupational/Functional Performance:   Current Status:  07/25/18  Comments:   Self-Feeding: Independent    Grooming: SBA SBA for completion of simple grooming tasks while standing at sink; cues given to maximize safety with management of walker. Upper Body Dressing: SBA    Lower Body Dressing: Mod A    Bathing: Mod A    Toileting: Min A    Bed Mobility: Not assessed. Functional Transfers: Sit-to-Stand: CGA Cues needed to maximize safety with functional transfers. Functional Mobility: Min A - Mod A for functional mobility with use of cane; CGA for use of walker within patient's bathroom, room, and hallway Cues needed to maximize safety with management of walker.      AM-[facility] Inpatient Daily Activity Raw Score: 17  AM-PAC Daily Activity Inpatient   How much help for putting on and taking off regular lower body clothing?: A Lot  How much help for Bathing?: A Lot  How much help for Toileting?: A Little  How much help for putting on and taking off regular upper body clothing?: A Little  How much help for taking care of personal grooming?: A Little  How much help for eating meals?: None  AM-[facility] Inpatient Daily Activity Raw Score: 17  AM-PAC Inpatient ADL T-Scale Score : 37.26  ADL Inpatient CMS 0-100% Score: 50.11  ADL Inpatient CMS G-Code Modifier : CK      Sitting OT treatment to be provided for ADL re-training, neuromuscular re-education, functional transfer/mobility training, cognitive re-training, equipment needs, energy conservation techniques, patient and/or family education/training, environmental modifications, compensatory techniques for ADL performance, and any splinting/positioning needs, all as needed. When appropriate, clear delegation orders will be provided for nursing staff. Goals:  1. Patient will perform grooming tasks with modified independence while standing at sink within 1 week. 2. Patient will perform upper body dressing/bathing tasks with modified independence, including item retrieval, within 1 week. 3. Patient will demonstrate 4+/5 BUE strength within 2 weeks in order to maximize independence with ADLs. 4. Patient will perform lower body dressing/bathing with modified independence, including item retrieval, within 2 weeks. 5. Patient will perform all aspects of toileting with modified independence within 2 weeks. 6. Patient will perform functional transfers with modified independence within 2 weeks in order to maximize independence with ADLs. 7. Patient will perform functional mobility, using AD as needed, with modified independence within 2 weeks in order to maximize independence with ADLs. 8. Patient will demonstrate Good understanding and consistent implementation of energy conservation techniques and work simplification techniques into ADL routines. Rehab Potential: Good    Patient and/or family indicated understanding of this OT plan of care: Yes    Time Out: 11:30am    OT Evaluation Complexity Level: Low  This level of OT evaluation was determined based upon the of the following: complexity of occupational profile and review of medical/therapy history completed, number of performance deficits demonstrated, and level of clinical decision making required to develop this OT plan of care.      Low Complexity Medium

## 2018-07-26 LAB
ANION GAP SERPL CALCULATED.3IONS-SCNC: 14 MMOL/L (ref 7–16)
BUN BLDV-MCNC: 23 MG/DL (ref 8–23)
CALCIUM SERPL-MCNC: 9.9 MG/DL (ref 8.6–10.2)
CHLORIDE BLD-SCNC: 90 MMOL/L (ref 98–107)
CO2: 24 MMOL/L (ref 22–29)
CREAT SERPL-MCNC: 1.2 MG/DL (ref 0.5–1)
GFR AFRICAN AMERICAN: 51
GFR NON-AFRICAN AMERICAN: 42 ML/MIN/1.73
GLUCOSE BLD-MCNC: 180 MG/DL (ref 74–109)
POTASSIUM SERPL-SCNC: 4.2 MMOL/L (ref 3.5–5)
SODIUM BLD-SCNC: 128 MMOL/L (ref 132–146)

## 2018-07-26 PROCEDURE — 97530 THERAPEUTIC ACTIVITIES: CPT

## 2018-07-26 PROCEDURE — 6370000000 HC RX 637 (ALT 250 FOR IP): Performed by: INTERNAL MEDICINE

## 2018-07-26 PROCEDURE — 1200000000 HC SEMI PRIVATE

## 2018-07-26 PROCEDURE — 36415 COLL VENOUS BLD VENIPUNCTURE: CPT

## 2018-07-26 PROCEDURE — 99221 1ST HOSP IP/OBS SF/LOW 40: CPT | Performed by: INTERNAL MEDICINE

## 2018-07-26 PROCEDURE — 80048 BASIC METABOLIC PNL TOTAL CA: CPT

## 2018-07-26 PROCEDURE — 2580000003 HC RX 258: Performed by: INTERNAL MEDICINE

## 2018-07-26 RX ORDER — SODIUM CHLORIDE 9 MG/ML
INJECTION, SOLUTION INTRAVENOUS CONTINUOUS
Status: DISCONTINUED | OUTPATIENT
Start: 2018-07-26 | End: 2018-07-27

## 2018-07-26 RX ADMIN — GABAPENTIN 300 MG: 300 CAPSULE ORAL at 09:04

## 2018-07-26 RX ADMIN — Medication 10 ML: at 10:05

## 2018-07-26 RX ADMIN — SODIUM CHLORIDE: 9 INJECTION, SOLUTION INTRAVENOUS at 10:05

## 2018-07-26 RX ADMIN — Medication 10 ML: at 21:08

## 2018-07-26 RX ADMIN — ESTRADIOL 1 MG: 0.5 TABLET ORAL at 09:04

## 2018-07-26 RX ADMIN — SIMVASTATIN 80 MG: 40 TABLET, FILM COATED ORAL at 21:08

## 2018-07-26 RX ADMIN — Medication 10 ML: at 09:05

## 2018-07-26 RX ADMIN — GABAPENTIN 300 MG: 300 CAPSULE ORAL at 21:08

## 2018-07-26 RX ADMIN — AMITRIPTYLINE HYDROCHLORIDE 25 MG: 25 TABLET, FILM COATED ORAL at 21:08

## 2018-07-26 RX ADMIN — SODIUM CHLORIDE: 9 INJECTION, SOLUTION INTRAVENOUS at 18:47

## 2018-07-26 RX ADMIN — PANTOPRAZOLE SODIUM 40 MG: 40 TABLET, DELAYED RELEASE ORAL at 05:18

## 2018-07-26 ASSESSMENT — PAIN SCALES - GENERAL: PAINLEVEL_OUTOF10: 0

## 2018-07-26 NOTE — PROGRESS NOTES
Subjective:    Awake and alert. No problems overnight. Denies chest pain or dyspnea. Denies abdominal pain. Tolerating diet. No nausea or vomiting. Objective:    /80   Pulse 80   Temp 97.9 °F (36.6 °C) (Oral)   Resp 16   Ht 5' 5\" (1.651 m)   Wt 134 lb 8 oz (61 kg)   SpO2 97%   BMI 22.38 kg/m²   Skin: Warm and dry  Neck: Supple, no JVD  Heart:  RRR, no murmurs, gallops, or rubs. Lungs:  CTA bilaterally, no wheeze, rales or rhonchi  Abd: bowel sounds present, nontender, nondistended, no masses  Extrem:  No clubbing, cyanosis, or edema, pulses intact    I/O last 3 completed shifts:   In: 480 [P.O.:480]  Out: 2100 [Urine:2100]    Laboratory:     BMP:    Lab Results   Component Value Date     07/26/2018    K 4.2 07/26/2018    CL 90 07/26/2018    CO2 24 07/26/2018    BUN 23 07/26/2018    CREATININE 1.2 07/26/2018    CALCIUM 9.9 07/26/2018    GFRAA 51 07/26/2018    LABGLOM 42 07/26/2018    GLUCOSE 180 07/26/2018    GLUCOSE 147 04/13/2012        Current Facility-Administered Medications   Medication Dose Route Frequency Provider Last Rate Last Dose    0.9 % sodium chloride infusion   Intravenous Continuous [facility] [place],  mL/hr at 07/26/18 1005      sodium chloride flush 0.9 % injection 10 mL  10 mL Intravenous 2 times per day [facility] [place] [place], DO   10 mL at 07/26/18 0905    sodium chloride flush 0.9 % injection 10 mL  10 mL Intravenous PRN Jeff Workmano, DO   10 mL at 07/26/18 1005    acetaminophen (TYLENOL) tablet 650 mg  650 mg Oral Q4H PRN Jeff Workmano, DO        amitriptyline (ELAVIL) tablet 25 mg  25 mg Oral Nightly [facility] [place], DO   25 mg at 07/25/18 2025    estradiol (ESTRACE) tablet 1 mg  1 mg Oral Daily [facility] [place], DO   1 mg at 07/26/18 2156    gabapentin (NEURONTIN) capsule 300 mg  300 mg Oral BID [facility] [place] [place] DO   300 mg at 07/26/18 0520    linaclotide (LINZESS) capsule 145 mcg  145 mcg Oral QAM AC Jeff Matos DO        simvastatin (ZOCOR) tablet 80 mg  80 mg Oral Daily Alena Ratel, DO   80 mg at 07/25/18 2025    pantoprazole (PROTONIX) tablet 40 mg  40 mg Oral QAM  Alena Ratel, DO   40 mg at 07/26/18 8137       Problem list:    Patient Active Problem List   Diagnosis    Elevated troponin    Multiple contusions of trunk, initial encounter    Electrolyte abnormality    Essential hypertension    Mixed hyperlipidemia    Contusion of thorax       Assessment:    1. Mechanical fall     2. Traumatic rhabdomyolysis secondary to mechanical fall with rlevated troponin, being treated with intravenous fluids     3. Hyponatremia and hypokalemia, likely diuretic induced     4. Essential hypertension     5. Hyperlipidemia    Plan:    1. Continue intravenous fluids. 2. Continue physical therapy    3. Home versus subacute rehab tomorrow    4.  Discussed with daughter by phone      Alena Pa D.O., Zoey Mattson  2:23 PM  7/26/2018

## 2018-07-26 NOTE — PROGRESS NOTES
Patient ambulated approx 180 ft around unit using front wheeled walker and stand by assist. Tolerated well and returned to chair.

## 2018-07-27 VITALS
RESPIRATION RATE: 14 BRPM | BODY MASS INDEX: 22.41 KG/M2 | WEIGHT: 134.5 LBS | TEMPERATURE: 97.6 F | SYSTOLIC BLOOD PRESSURE: 123 MMHG | HEART RATE: 76 BPM | HEIGHT: 65 IN | DIASTOLIC BLOOD PRESSURE: 67 MMHG | OXYGEN SATURATION: 96 %

## 2018-07-27 LAB
ANION GAP SERPL CALCULATED.3IONS-SCNC: 12 MMOL/L (ref 7–16)
BUN BLDV-MCNC: 22 MG/DL (ref 8–23)
CALCIUM SERPL-MCNC: 9 MG/DL (ref 8.6–10.2)
CHLORIDE BLD-SCNC: 100 MMOL/L (ref 98–107)
CO2: 25 MMOL/L (ref 22–29)
CREAT SERPL-MCNC: 1.1 MG/DL (ref 0.5–1)
GFR AFRICAN AMERICAN: 56
GFR NON-AFRICAN AMERICAN: 47 ML/MIN/1.73
GLUCOSE BLD-MCNC: 120 MG/DL (ref 74–109)
POTASSIUM SERPL-SCNC: 3.6 MMOL/L (ref 3.5–5)
SODIUM BLD-SCNC: 137 MMOL/L (ref 132–146)

## 2018-07-27 PROCEDURE — 97530 THERAPEUTIC ACTIVITIES: CPT | Performed by: PHYSICAL THERAPIST

## 2018-07-27 PROCEDURE — 2580000003 HC RX 258: Performed by: INTERNAL MEDICINE

## 2018-07-27 PROCEDURE — 36415 COLL VENOUS BLD VENIPUNCTURE: CPT

## 2018-07-27 PROCEDURE — 80048 BASIC METABOLIC PNL TOTAL CA: CPT

## 2018-07-27 PROCEDURE — 6370000000 HC RX 637 (ALT 250 FOR IP): Performed by: INTERNAL MEDICINE

## 2018-07-27 RX ORDER — LISINOPRIL 10 MG/1
10 TABLET ORAL DAILY
Qty: 30 TABLET | Refills: 3 | Status: ON HOLD
Start: 2018-07-27 | End: 2021-08-28 | Stop reason: HOSPADM

## 2018-07-27 RX ORDER — LISINOPRIL 10 MG/1
10 TABLET ORAL DAILY
Status: DISCONTINUED | OUTPATIENT
Start: 2018-07-27 | End: 2018-07-27 | Stop reason: HOSPADM

## 2018-07-27 RX ADMIN — LISINOPRIL 10 MG: 10 TABLET ORAL at 10:12

## 2018-07-27 RX ADMIN — PANTOPRAZOLE SODIUM 40 MG: 40 TABLET, DELAYED RELEASE ORAL at 05:17

## 2018-07-27 RX ADMIN — GABAPENTIN 300 MG: 300 CAPSULE ORAL at 10:08

## 2018-07-27 RX ADMIN — Medication 10 ML: at 10:08

## 2018-07-27 RX ADMIN — ESTRADIOL 1 MG: 0.5 TABLET ORAL at 10:08

## 2018-07-27 ASSESSMENT — PAIN SCALES - GENERAL: PAINLEVEL_OUTOF10: 0

## 2018-07-27 NOTE — PROGRESS NOTES
[facility] Quality Flow/Interdisciplinary Rounds Progress Note        Quality Flow Rounds held on July 27, 2018    Disciplines Attending:  Bedside Nurse, ,  and Nursing Unit Leadership    Tanika Fowler was admitted on 7/24/2018  7:09 PM    Anticipated Discharge Date:  Expected Discharge Date: 07/26/18    Disposition:    Merritt Score:  Merritt Scale Score: 19    Readmission Risk              Risk of Unplanned Readmission:        8             Discussed patient goal for the day, patient clinical progression, and barriers to discharge.   The following Goal(s) of the Day/Commitment(s) have been identified:  Discharge [address] [place]  July 27, 2018

## 2018-07-27 NOTE — PROGRESS NOTES
Pt demonstrated skill Pt requires further education in this area     x     Additional Comments: Pt found sitting in bedside chair agreeable to treatment. Pt reports she wants to go home today. Pt slightly unsteady but no LOB during session. Recommend use of w/w at home. Pt was left sitting in bedside chair with call light left by patient. Time in: 0820  Time out: 0835    Pt is making good progress toward established Physical Therapy goals. Continue with physical therapy current plan of care.     Matthew Lobo PT, DPT [id number]

## 2018-07-27 NOTE — DISCHARGE SUMMARY
Physician Discharge Summary     Patient ID:  Noemy Gibson  [id number]  79 y.o.  3/13/1927    Admit date: 7/24/2018    Discharge date and time: 7/27/2018  1:20 PM     Admission Diagnoses: Elevated troponin [R74.8]  Elevated troponin [R74.8]  Contusion of thorax [S20.20XA]    Discharge Diagnoses:      1. Mechanical fall     2. Traumatic rhabdomyolysis secondary to mechanical fall with rlevated troponin, being treated with intravenous fluids     3. Hyponatremia and hypokalemia, likely diuretic induced     4. Essential hypertension     5. Hyperlipidemia         Consults: cardiology    Procedures: none    Laboratory:   Last 3 BMP  Recent Labs      07/25/18   0543  07/26/18   0853  07/27/18   0324   NA  126*  128*  137   K  3.4*  4.2  3.6   CL  88*  90*  100   CO2  27 24 25   BUN  21  23  22   CREATININE  1.1*  1.2*  1.1*   GLUCOSE  92  180*  120*   CALCIUM  9.0  9.9  9.0       Last 3 CBC:  Recent Labs      07/24/18 2035 07/25/18   0543   WBC  12.4*  5.5   RBC  3.70  3.28*   HGB  12.0  10.5*   HCT  33.2*  29.3*   MCV  89.7  89.3   MCH  32.4  32.0   MCHC  36.1*  35.8*   RDW  12.5  12.5   PLT  197  169   MPV  9.1  9.2       Recent Labs      07/24/18 2035   PROTIME  12.5*   INR  1.1     Results for Juan Talbot (MRN [id number]) as of 7/30/2018 05:27   Ref. Range 7/24/2018 20:35 7/25/2018 05:43   Total CK Latest Ref Range: 20 - 180 U/L 265 (H) 309 (H)   CK-MB Latest Ref Range: 0.0 - 4.3 ng/mL  14.5 (H)   Troponin Latest Ref Range: 0.00 - 0.03 ng/mL 0.08 (H) 0.06 (H)     Hospital Course:        The patient is a 80 y.o. female patient of Dr. Carmella Treadwell who presents with a mechanical fall from home. She was in her basement doing laundry. When she came up the steps she missed the landing, lost her balance and fell backward down the steps and hit her head. She denies any loss of consciousness. She was on the ground for approximately one hour.  She called her daughter and paramedics were called and brought her to the

## 2018-08-23 PROBLEM — R77.8 ELEVATED TROPONIN: Status: RESOLVED | Noted: 2018-07-24 | Resolved: 2018-08-23

## 2018-11-20 ENCOUNTER — TELEPHONE (OUTPATIENT)
Dept: ENT CLINIC | Age: 83
End: 2018-11-20

## 2018-11-20 NOTE — TELEPHONE ENCOUNTER
Patient scheduled for 2/4. She cxl 6 month cerumen check in October and now needs ears cleaned for new hearing aids. Will move appt if something comes open.

## 2019-05-03 ENCOUNTER — HOSPITAL ENCOUNTER (OUTPATIENT)
Age: 84
Discharge: HOME | End: 2019-05-05
Payer: MEDICARE

## 2019-05-03 ENCOUNTER — HOSPITAL ENCOUNTER (OUTPATIENT)
Dept: ULTRASOUND IMAGING | Age: 84
Discharge: HOME | End: 2019-05-05
Payer: MEDICARE

## 2019-05-03 ENCOUNTER — HOSPITAL ENCOUNTER (OUTPATIENT)
Dept: GENERAL RADIOLOGY | Age: 84
Discharge: HOME | End: 2019-05-05
Payer: MEDICARE

## 2019-05-03 DIAGNOSIS — M25.561 RIGHT KNEE PAIN, UNSPECIFIED CHRONICITY: ICD-10-CM

## 2019-05-03 PROCEDURE — 73564 X-RAY EXAM KNEE 4 OR MORE: CPT

## 2019-05-03 PROCEDURE — 93971 EXTREMITY STUDY: CPT

## 2021-08-25 ENCOUNTER — APPOINTMENT (OUTPATIENT)
Dept: GENERAL RADIOLOGY | Age: 86
End: 2021-08-25
Payer: MEDICARE

## 2021-08-25 ENCOUNTER — APPOINTMENT (OUTPATIENT)
Dept: CT IMAGING | Age: 86
End: 2021-08-25
Payer: MEDICARE

## 2021-08-25 ENCOUNTER — HOSPITAL ENCOUNTER (OUTPATIENT)
Age: 86
Setting detail: OBSERVATION
Discharge: HOME | End: 2021-08-28
Attending: EMERGENCY MEDICINE | Admitting: INTERNAL MEDICINE
Payer: MEDICARE

## 2021-08-25 DIAGNOSIS — R55 SYNCOPE AND COLLAPSE: Primary | ICD-10-CM

## 2021-08-25 PROBLEM — J18.9 PNEUMONIA: Status: ACTIVE | Noted: 2021-08-25

## 2021-08-25 PROBLEM — N39.0 UTI (URINARY TRACT INFECTION): Status: ACTIVE | Noted: 2021-08-25

## 2021-08-25 PROBLEM — K21.9 GASTROESOPHAGEAL REFLUX DISEASE WITHOUT ESOPHAGITIS: Status: ACTIVE | Noted: 2021-08-25

## 2021-08-25 PROBLEM — I95.1 ORTHOSTASIS: Status: ACTIVE | Noted: 2021-08-25

## 2021-08-25 LAB
ALBUMIN SERPL-MCNC: 4 G/DL (ref 3.5–5.2)
ALP BLD-CCNC: 75 U/L (ref 35–104)
ALT SERPL-CCNC: 13 U/L (ref 0–32)
ANION GAP SERPL CALCULATED.3IONS-SCNC: 5 MMOL/L (ref 7–16)
ANISOCYTOSIS: ABNORMAL
AST SERPL-CCNC: 22 U/L (ref 0–31)
BACTERIA: ABNORMAL /HPF
BASOPHILS ABSOLUTE: 0.03 E9/L (ref 0–0.2)
BASOPHILS RELATIVE PERCENT: 0.5 % (ref 0–2)
BILIRUB SERPL-MCNC: 0.4 MG/DL (ref 0–1.2)
BILIRUBIN URINE: NEGATIVE
BLOOD, URINE: ABNORMAL
BUN BLDV-MCNC: 10 MG/DL (ref 6–23)
BURR CELLS: ABNORMAL
CALCIUM SERPL-MCNC: 9.3 MG/DL (ref 8.6–10.2)
CHLORIDE BLD-SCNC: 101 MMOL/L (ref 98–107)
CLARITY: CLEAR
CO2: 27 MMOL/L (ref 22–29)
COLOR: YELLOW
CREAT SERPL-MCNC: 0.8 MG/DL (ref 0.5–1)
EKG ATRIAL RATE: 59 BPM
EKG P AXIS: 25 DEGREES
EKG P-R INTERVAL: 220 MS
EKG Q-T INTERVAL: 388 MS
EKG QRS DURATION: 80 MS
EKG QTC CALCULATION (BAZETT): 384 MS
EKG R AXIS: -2 DEGREES
EKG T AXIS: -22 DEGREES
EKG VENTRICULAR RATE: 59 BPM
EOSINOPHILS ABSOLUTE: 0.03 E9/L (ref 0.05–0.5)
EOSINOPHILS RELATIVE PERCENT: 0.5 % (ref 0–6)
GFR AFRICAN AMERICAN: >60
GFR NON-AFRICAN AMERICAN: >60 ML/MIN/1.73
GLUCOSE BLD-MCNC: 148 MG/DL (ref 74–99)
GLUCOSE URINE: NEGATIVE MG/DL
HCT VFR BLD CALC: 41.6 % (ref 34–48)
HEMOGLOBIN: 13.8 G/DL (ref 11.5–15.5)
IMMATURE GRANULOCYTES #: 0.02 E9/L
IMMATURE GRANULOCYTES %: 0.4 % (ref 0–5)
KETONES, URINE: NEGATIVE MG/DL
LACTIC ACID, SEPSIS: 0.7 MMOL/L (ref 0.5–1.9)
LEUKOCYTE ESTERASE, URINE: NEGATIVE
LYMPHOCYTES ABSOLUTE: 0.52 E9/L (ref 1.5–4)
LYMPHOCYTES RELATIVE PERCENT: 9.4 % (ref 20–42)
MCH RBC QN AUTO: 31.4 PG (ref 26–35)
MCHC RBC AUTO-ENTMCNC: 33.2 % (ref 32–34.5)
MCV RBC AUTO: 94.5 FL (ref 80–99.9)
MONOCYTES ABSOLUTE: 0.24 E9/L (ref 0.1–0.95)
MONOCYTES RELATIVE PERCENT: 4.3 % (ref 2–12)
NEUTROPHILS ABSOLUTE: 4.72 E9/L (ref 1.8–7.3)
NEUTROPHILS RELATIVE PERCENT: 84.9 % (ref 43–80)
NITRITE, URINE: POSITIVE
OVALOCYTES: ABNORMAL
PDW BLD-RTO: 13.1 FL (ref 11.5–15)
PH UA: 6.5 (ref 5–9)
PLATELET # BLD: 167 E9/L (ref 130–450)
PMV BLD AUTO: 9.5 FL (ref 7–12)
POIKILOCYTES: ABNORMAL
POLYCHROMASIA: ABNORMAL
POTASSIUM SERPL-SCNC: 4.2 MMOL/L (ref 3.5–5)
PROTEIN UA: NEGATIVE MG/DL
RBC # BLD: 4.4 E12/L (ref 3.5–5.5)
RBC UA: ABNORMAL /HPF (ref 0–2)
SCHISTOCYTES: ABNORMAL
SODIUM BLD-SCNC: 133 MMOL/L (ref 132–146)
SPECIFIC GRAVITY UA: 1.01 (ref 1–1.03)
TEAR DROP CELLS: ABNORMAL
TOTAL PROTEIN: 6.1 G/DL (ref 6.4–8.3)
TROPONIN, HIGH SENSITIVITY: 37 NG/L (ref 0–9)
TROPONIN, HIGH SENSITIVITY: 37 NG/L (ref 0–9)
UROBILINOGEN, URINE: 0.2 E.U./DL
WBC # BLD: 5.6 E9/L (ref 4.5–11.5)
WBC UA: ABNORMAL /HPF (ref 0–5)

## 2021-08-25 PROCEDURE — 6360000002 HC RX W HCPCS: Performed by: EMERGENCY MEDICINE

## 2021-08-25 PROCEDURE — 71045 X-RAY EXAM CHEST 1 VIEW: CPT

## 2021-08-25 PROCEDURE — G0378 HOSPITAL OBSERVATION PER HR: HCPCS

## 2021-08-25 PROCEDURE — 6360000004 HC RX CONTRAST MEDICATION: Performed by: RADIOLOGY

## 2021-08-25 PROCEDURE — 70496 CT ANGIOGRAPHY HEAD: CPT

## 2021-08-25 PROCEDURE — 36415 COLL VENOUS BLD VENIPUNCTURE: CPT

## 2021-08-25 PROCEDURE — 87186 SC STD MICRODIL/AGAR DIL: CPT

## 2021-08-25 PROCEDURE — 2580000003 HC RX 258: Performed by: EMERGENCY MEDICINE

## 2021-08-25 PROCEDURE — 87077 CULTURE AEROBIC IDENTIFY: CPT

## 2021-08-25 PROCEDURE — 84484 ASSAY OF TROPONIN QUANT: CPT

## 2021-08-25 PROCEDURE — 93005 ELECTROCARDIOGRAM TRACING: CPT | Performed by: EMERGENCY MEDICINE

## 2021-08-25 PROCEDURE — 96365 THER/PROPH/DIAG IV INF INIT: CPT

## 2021-08-25 PROCEDURE — 6370000000 HC RX 637 (ALT 250 FOR IP): Performed by: NURSE PRACTITIONER

## 2021-08-25 PROCEDURE — 6360000002 HC RX W HCPCS: Performed by: NURSE PRACTITIONER

## 2021-08-25 PROCEDURE — 70450 CT HEAD/BRAIN W/O DYE: CPT

## 2021-08-25 PROCEDURE — 80053 COMPREHEN METABOLIC PANEL: CPT

## 2021-08-25 PROCEDURE — 85025 COMPLETE CBC W/AUTO DIFF WBC: CPT

## 2021-08-25 PROCEDURE — 70498 CT ANGIOGRAPHY NECK: CPT

## 2021-08-25 PROCEDURE — 87088 URINE BACTERIA CULTURE: CPT

## 2021-08-25 PROCEDURE — 81001 URINALYSIS AUTO W/SCOPE: CPT

## 2021-08-25 PROCEDURE — 96375 TX/PRO/DX INJ NEW DRUG ADDON: CPT

## 2021-08-25 PROCEDURE — 83605 ASSAY OF LACTIC ACID: CPT

## 2021-08-25 PROCEDURE — 2580000003 HC RX 258: Performed by: NURSE PRACTITIONER

## 2021-08-25 PROCEDURE — 99284 EMERGENCY DEPT VISIT MOD MDM: CPT

## 2021-08-25 PROCEDURE — 84145 PROCALCITONIN (PCT): CPT

## 2021-08-25 RX ORDER — PANTOPRAZOLE SODIUM 40 MG/1
40 TABLET, DELAYED RELEASE ORAL
Status: DISCONTINUED | OUTPATIENT
Start: 2021-08-26 | End: 2021-08-28 | Stop reason: HOSPADM

## 2021-08-25 RX ORDER — 0.9 % SODIUM CHLORIDE 0.9 %
500 INTRAVENOUS SOLUTION INTRAVENOUS ONCE
Status: COMPLETED | OUTPATIENT
Start: 2021-08-25 | End: 2021-08-25

## 2021-08-25 RX ORDER — GABAPENTIN 300 MG/1
300 CAPSULE ORAL 2 TIMES DAILY
Status: DISCONTINUED | OUTPATIENT
Start: 2021-08-25 | End: 2021-08-28 | Stop reason: HOSPADM

## 2021-08-25 RX ORDER — SODIUM CHLORIDE 9 MG/ML
25 INJECTION, SOLUTION INTRAVENOUS PRN
Status: DISCONTINUED | OUTPATIENT
Start: 2021-08-25 | End: 2021-08-28 | Stop reason: HOSPADM

## 2021-08-25 RX ORDER — ONDANSETRON 4 MG/1
4 TABLET, ORALLY DISINTEGRATING ORAL EVERY 8 HOURS PRN
Status: DISCONTINUED | OUTPATIENT
Start: 2021-08-25 | End: 2021-08-28 | Stop reason: HOSPADM

## 2021-08-25 RX ORDER — SODIUM CHLORIDE 9 MG/ML
INJECTION, SOLUTION INTRAVENOUS CONTINUOUS
Status: DISCONTINUED | OUTPATIENT
Start: 2021-08-25 | End: 2021-08-27

## 2021-08-25 RX ORDER — ACETAMINOPHEN 325 MG/1
650 TABLET ORAL EVERY 6 HOURS PRN
Status: DISCONTINUED | OUTPATIENT
Start: 2021-08-25 | End: 2021-08-28 | Stop reason: HOSPADM

## 2021-08-25 RX ORDER — 0.9 % SODIUM CHLORIDE 0.9 %
500 INTRAVENOUS SOLUTION INTRAVENOUS ONCE
Status: DISCONTINUED | OUTPATIENT
Start: 2021-08-25 | End: 2021-08-28 | Stop reason: HOSPADM

## 2021-08-25 RX ORDER — AMITRIPTYLINE HYDROCHLORIDE 25 MG/1
25 TABLET, FILM COATED ORAL NIGHTLY
Status: DISCONTINUED | OUTPATIENT
Start: 2021-08-25 | End: 2021-08-28 | Stop reason: HOSPADM

## 2021-08-25 RX ORDER — LISINOPRIL 10 MG/1
10 TABLET ORAL DAILY
Status: DISCONTINUED | OUTPATIENT
Start: 2021-08-26 | End: 2021-08-28 | Stop reason: HOSPADM

## 2021-08-25 RX ORDER — POLYETHYLENE GLYCOL 3350 17 G/17G
17 POWDER, FOR SOLUTION ORAL DAILY PRN
Status: DISCONTINUED | OUTPATIENT
Start: 2021-08-25 | End: 2021-08-28 | Stop reason: HOSPADM

## 2021-08-25 RX ORDER — OMEPRAZOLE 20 MG/1
40 CAPSULE, DELAYED RELEASE ORAL EVERY MORNING
Status: DISCONTINUED | OUTPATIENT
Start: 2021-08-26 | End: 2021-08-25 | Stop reason: CLARIF

## 2021-08-25 RX ORDER — ATORVASTATIN CALCIUM 40 MG/1
40 TABLET, FILM COATED ORAL DAILY
Status: DISCONTINUED | OUTPATIENT
Start: 2021-08-26 | End: 2021-08-28 | Stop reason: HOSPADM

## 2021-08-25 RX ORDER — SODIUM CHLORIDE 0.9 % (FLUSH) 0.9 %
5-40 SYRINGE (ML) INJECTION PRN
Status: DISCONTINUED | OUTPATIENT
Start: 2021-08-25 | End: 2021-08-28 | Stop reason: HOSPADM

## 2021-08-25 RX ORDER — ESTRADIOL 0.5 MG/1
1 TABLET ORAL DAILY
Status: DISCONTINUED | OUTPATIENT
Start: 2021-08-26 | End: 2021-08-28 | Stop reason: HOSPADM

## 2021-08-25 RX ORDER — SIMVASTATIN 40 MG
80 TABLET ORAL DAILY
Status: DISCONTINUED | OUTPATIENT
Start: 2021-08-26 | End: 2021-08-25 | Stop reason: CLARIF

## 2021-08-25 RX ORDER — ONDANSETRON 2 MG/ML
4 INJECTION INTRAMUSCULAR; INTRAVENOUS EVERY 6 HOURS PRN
Status: DISCONTINUED | OUTPATIENT
Start: 2021-08-25 | End: 2021-08-28 | Stop reason: HOSPADM

## 2021-08-25 RX ORDER — SODIUM CHLORIDE 0.9 % (FLUSH) 0.9 %
5-40 SYRINGE (ML) INJECTION EVERY 12 HOURS SCHEDULED
Status: DISCONTINUED | OUTPATIENT
Start: 2021-08-25 | End: 2021-08-28 | Stop reason: HOSPADM

## 2021-08-25 RX ORDER — ACETAMINOPHEN 650 MG/1
650 SUPPOSITORY RECTAL EVERY 6 HOURS PRN
Status: DISCONTINUED | OUTPATIENT
Start: 2021-08-25 | End: 2021-08-28 | Stop reason: HOSPADM

## 2021-08-25 RX ADMIN — SODIUM CHLORIDE: 9 INJECTION, SOLUTION INTRAVENOUS at 23:05

## 2021-08-25 RX ADMIN — AZITHROMYCIN 500 MG: 500 INJECTION, POWDER, LYOPHILIZED, FOR SOLUTION INTRAVENOUS at 23:05

## 2021-08-25 RX ADMIN — GABAPENTIN 300 MG: 300 CAPSULE ORAL at 23:06

## 2021-08-25 RX ADMIN — IOPAMIDOL 75 ML: 755 INJECTION, SOLUTION INTRAVENOUS at 15:47

## 2021-08-25 RX ADMIN — SODIUM CHLORIDE 500 ML: 9 INJECTION, SOLUTION INTRAVENOUS at 14:09

## 2021-08-25 RX ADMIN — SODIUM CHLORIDE, PRESERVATIVE FREE 10 ML: 5 INJECTION INTRAVENOUS at 23:06

## 2021-08-25 RX ADMIN — AMITRIPTYLINE HYDROCHLORIDE 25 MG: 25 TABLET, FILM COATED ORAL at 23:06

## 2021-08-25 RX ADMIN — CEFTRIAXONE 1000 MG: 1 INJECTION, POWDER, FOR SOLUTION INTRAMUSCULAR; INTRAVENOUS at 20:36

## 2021-08-25 ASSESSMENT — PAIN SCALES - GENERAL: PAINLEVEL_OUTOF10: 0

## 2021-08-25 NOTE — ED NOTES
Bed:  HALL-08  Expected date:   Expected time:   Means of arrival:   Comments:  Beau Ortiz RN  08/25/21 [id number]

## 2021-08-25 NOTE — ED NOTES
Pt's daughter Fayette Kanner is waiting for pt if needed for information and updates contact number ROSELINE Mayers  08/25/21 [phone]

## 2021-08-25 NOTE — ED PROVIDER NOTES
EXAM--------------------------------------    Constitutional/General: Alert and oriented x3, well appearing, non toxic in NAD  Head: Normocephalic and atraumatic  Eyes: PERRL, EOMI, conjunctive normal, sclera non icteric  Mouth: Oropharynx clear, handling secretions, no trismus, no asymmetry of the posterior oropharynx or uvular edema  Neck: Supple, full ROM, non tender to palpation in the midline, no stridor, no crepitus, no meningeal signs  Respiratory: Lungs clear to auscultation bilaterally, no wheezes, rales, or rhonchi. Not in respiratory distress  Cardiovascular:  Regular rate. Regular rhythm. No murmurs, gallops, or rubs. 2+ distal pulses  Chest: No chest wall tenderness  GI:  Abdomen Soft, Non tender, Non distended. +BS. No organomegaly, no palpable masses,  No rebound, guarding, or rigidity. Musculoskeletal: Moves all extremities x 4. Warm and well perfused, no clubbing, cyanosis, or edema. Capillary refill <3 seconds  Integument: skin warm and dry. No rashes. Lymphatic: no lymphadenopathy noted  Neurologic: GCS 15, no focal deficits, symmetric strength 5/5 in the upper and lower extremities bilaterally; aphasia or dysarthria or facial droop. NIHSS 0  Psychiatric: Normal Affect    -------------------------------------------------- RESULTS -------------------------------------------------  I have personally reviewed all laboratory and imaging results for this patient. Results are listed below. LABS:  Results for orders placed or performed during the hospital encounter of 08/25/21   Legionella antigen, urine    Specimen: Urine, indwelling catheter   Result Value Ref Range    L. pneumophila Serogp 1 Ur Ag       Presumptive Negative -suggesting no recent or current infections  with Legionella pneumophila serogroup 1.   Infection to Legionella cannot be ruled out since other serogroups  and species may cause infection, antigen may not be present in  early infection, or level of antigen may be below the  detection limit. Normal Range: Presumptive Negative     Strep Pneumoniae Antigen    Specimen: Urine, indwelling catheter   Result Value Ref Range    STREP PNEUMONIAE ANTIGEN, URINE       Presumptive negative- suggests no current or recent  pneumococcal infection.   Infection due to Strep pneumoniae cannot be  ruled out since the antigen present in the sample  may be below the detection limit of the test.  Normal Range:Presumptive Negative     Respiratory Panel, Molecular, with COVID-19 (Restricted: peds pts or suitable admitted adults)    Specimen: Nasopharyngeal; Nasal   Result Value Ref Range    Adenovirus by PCR Not Detected Not Detected    Bordetella parapertussis by PCR Not Detected Not Detected    Bordetella pertussis by PCR Not Detected Not Detected    Chlamydophilia pneumoniae by PCR Not Detected Not Detected    Coronavirus 229E by PCR Not Detected Not Detected    Coronavirus HKU1 by PCR Not Detected Not Detected    Coronavirus NL63 by PCR Not Detected Not Detected    Coronavirus OC43 by PCR Not Detected Not Detected    SARS-CoV-2, PCR Not Detected Not Detected    Human Metapneumovirus by PCR Not Detected Not Detected    Human Rhinovirus/Enterovirus by PCR Not Detected Not Detected    Influenza A by PCR Not Detected Not Detected    Influenza B by PCR Not Detected Not Detected    Mycoplasma pneumoniae by PCR Not Detected Not Detected    Parainfluenza Virus 1 by PCR Not Detected Not Detected    Parainfluenza Virus 2 by PCR Not Detected Not Detected    Parainfluenza Virus 3 by PCR Not Detected Not Detected    Parainfluenza Virus 4 by PCR Not Detected Not Detected    Respiratory Syncytial Virus by PCR Not Detected Not Detected   CBC Auto Differential   Result Value Ref Range    WBC 5.6 4.5 - 11.5 E9/L    RBC 4.40 3.50 - 5.50 E12/L    Hemoglobin 13.8 11.5 - 15.5 g/dL    Hematocrit 41.6 34.0 - 48.0 %    MCV 94.5 80.0 - 99.9 fL    MCH 31.4 26.0 - 35.0 pg    MCHC 33.2 32.0 - 34.5 %    RDW 13.1 11.5 - 15.0 fL Troponin   Result Value Ref Range    Troponin, High Sensitivity 37 (H) 0 - 9 ng/L   Microscopic Urinalysis   Result Value Ref Range    WBC, UA 1-3 0 - 5 /HPF    RBC, UA 1-3 0 - 2 /HPF    Bacteria, UA MANY (A) None Seen /HPF   Basic Metabolic Panel w/ Reflex to MG   Result Value Ref Range    Sodium 139 132 - 146 mmol/L    Potassium reflex Magnesium 4.2 3.5 - 5.0 mmol/L    Chloride 104 98 - 107 mmol/L    CO2 27 22 - 29 mmol/L    Anion Gap 8 7 - 16 mmol/L    Glucose 98 74 - 99 mg/dL    BUN 8 6 - 23 mg/dL    CREATININE 0.7 0.5 - 1.0 mg/dL    GFR Non-African American >60 >=60 mL/min/1.73    GFR African American >60     Calcium 9.2 8.6 - 10.2 mg/dL   CBC   Result Value Ref Range    WBC 5.9 4.5 - 11.5 E9/L    RBC 4.21 3.50 - 5.50 E12/L    Hemoglobin 13.0 11.5 - 15.5 g/dL    Hematocrit 39.2 34.0 - 48.0 %    MCV 93.1 80.0 - 99.9 fL    MCH 30.9 26.0 - 35.0 pg    MCHC 33.2 32.0 - 34.5 %    RDW 13.2 11.5 - 15.0 fL    Platelets 061 230 - 205 E9/L    MPV 9.7 7.0 - 12.0 fL   Troponin   Result Value Ref Range    Troponin, High Sensitivity 40 (H) 0 - 9 ng/L   Procalcitonin   Result Value Ref Range    Procalcitonin 0.07 0.00 - 0.08 ng/mL   CK   Result Value Ref Range    Total  (H) 20 - 180 U/L   Cortisol   Result Value Ref Range    Cortisol 9.44 2.68 - 18.40 mcg/dL   Procalcitonin   Result Value Ref Range    Procalcitonin 0.04 0.00 - 0.08 ng/mL   TSH without Reflex   Result Value Ref Range    TSH 2.150 0.270 - 4.200 uIU/mL   T4, Free   Result Value Ref Range    T4 Free 1.13 0.93 - 1.70 ng/dL   EKG 12 Lead   Result Value Ref Range    Ventricular Rate 59 BPM    Atrial Rate 59 BPM    P-R Interval 220 ms    QRS Duration 80 ms    Q-T Interval 388 ms    QTc Calculation (Bazett) 384 ms    P Axis 25 degrees    R Axis -2 degrees    T Axis -22 degrees       RADIOLOGY:  Interpreted by Radiologist.  CT HEAD WO CONTRAST   Final Result   1. No acute intracranial abnormality. 2. Unremarkable CTA of the brain and neck.          CTA HEAD W CONTRAST   Final Result   1. No acute intracranial abnormality. 2. Unremarkable CTA of the brain and neck. CTA NECK W CONTRAST   Final Result   1. No acute intracranial abnormality. 2. Unremarkable CTA of the brain and neck. XR CHEST PORTABLE   Final Result   Bibasilar opacities notable on the right could suggest subsegmental   atelectasis or bibasilar pneumonia. Short-term follow-up may be helpful for   further evaluation. EKG:  EKG shows sinus rhythm at 59 beats a minute no signs of any ST changes. QTc 384. No change from previous EKG. EKG interpreted by myself.      ------------------------- NURSING NOTES AND VITALS REVIEWED ---------------------------   The nursing notes within the ED encounter and vital signs as below have been reviewed by myself. BP (!) 165/89   Pulse 79   Temp 97.8 °F (36.6 °C) (Oral)   Resp 16   Ht 5' 5\" (1.651 m)   Wt 118 lb 9.6 oz (53.8 kg)   SpO2 96%   BMI 19.74 kg/m²   Oxygen Saturation Interpretation: Normal    The patients available past medical records and past encounters were reviewed.         ------------------------------ ED COURSE/MEDICAL DECISION MAKING----------------------  Medications   0.9 % sodium chloride bolus (has no administration in time range)   sodium chloride flush 0.9 % injection 5-40 mL (10 mLs IntraVENous Given 8/27/21 0846)   sodium chloride flush 0.9 % injection 5-40 mL (has no administration in time range)   0.9 % sodium chloride infusion (has no administration in time range)   enoxaparin (LOVENOX) injection 40 mg (40 mg Subcutaneous Given 8/27/21 0845)   ondansetron (ZOFRAN-ODT) disintegrating tablet 4 mg (has no administration in time range)     Or   ondansetron (ZOFRAN) injection 4 mg (has no administration in time range)   polyethylene glycol (GLYCOLAX) packet 17 g (has no administration in time range)   acetaminophen (TYLENOL) tablet 650 mg (has no administration in time range)     Or   acetaminophen (TYLENOL) suppository 650 mg (has no administration in time range)   perflutren lipid microspheres (DEFINITY) injection 1.65 mg (has no administration in time range)   amitriptyline (ELAVIL) tablet 25 mg ( Oral Automatically Held 8/31/21 2100)   estradiol (ESTRACE) tablet 1 mg ( Oral Automatically Held 9/1/21 0900)   gabapentin (NEURONTIN) capsule 300 mg ( Oral Automatically Held 8/31/21 2100)   linaclotide (LINZESS) capsule 145 mcg (0 mcg Oral Held 8/27/21 0550)   lisinopril (PRINIVIL;ZESTRIL) tablet 10 mg (10 mg Oral Given 8/27/21 0845)   pantoprazole (PROTONIX) tablet 40 mg (40 mg Oral Given 8/27/21 0550)   atorvastatin (LIPITOR) tablet 40 mg (40 mg Oral Given 8/27/21 0845)   cosyntropin (CORTROSYN) injection 250 mcg (has no administration in time range)   0.9 % sodium chloride bolus (500 mLs IntraVENous New Bag 8/25/21 1409)   iopamidol (ISOVUE-370) 76 % injection 75 mL (75 mLs IntraVENous Given 8/25/21 1547)   cefTRIAXone (ROCEPHIN) 1,000 mg in sterile water 10 mL IV syringe (1,000 mg IntraVENous New Bag 8/25/21 2036)   azithromycin (ZITHROMAX) 500 mg in D5W 250ml addavial (0 mg IntraVENous Stopped 8/26/21 0014)   ampicillin-sulbactam (UNASYN) 3000 mg ivpb minibag (0 mg IntraVENous Stopped 8/27/21 1114)         ED COURSE:  ED Course as of Aug 27 1151   Wed Aug 25, 2021   1852 Patient has no complaints at this time she is awaiting second troponin. She states she does not want to stay in the hospital she is alert and oriented at this time. We did call the daughter in to see if she could talk to the daughter patient about the fact that she is recommended that she stay for further evaluation for the syncope. Patient lives at home with the daughter daughter does agree that she should stay as well. She will talk to her at this time. Zachariah Uriarte   2006 I spoke to April from the [employer] admit to Dr. Tereso Patterson accepted to a monitored bed.     [KK]      ED Course User Index  [KK] Jhonny Green MD       Medical Decision Making: Is a pleasant 80 old female with syncopal event at home. States she thought she might have been dehydrated no chest pain or shortness of breath no numbness no weakness no neurologic abnormalities at this time. This patient has remained hemodynamically stable during their ED course. EKG showed normal sinus rhythm at 59 beats a minute no signs of any ST changes. Lactic acid was normal nitrites were positive in the urine with many bacteria she was treated withPatient had a CBC that was normal hemoglobin and white count were normal.  Chemistry was normal LFTs were normal first troponin was 37-second also 37 delta of 0. Rocephin for UTI CT the head showed no acute abnormalities. CTA of the head and neck showed no acute abnormalities no significant carotid stenosis. Chest x-ray showed bibasilar opacities could be atelectasis patient denies any cough or fever. I do not feel this is a true pneumonia. Patient improved symptoms here in the emergency department. I spoke to her as well as her daughter, whom she lives with. They are both in agreement with plan for admission. Patient will be admitted to Dr. Huy Love service for further evaluation for syncope and treatment for UTI. Re-Evaluations:             Re-evaluation. Patients symptoms improved. Re-examination  8/25/21   1:07 PM EDT          Vital Signs:   Vitals:    08/26/21 1544 08/26/21 1945 08/27/21 0233 08/27/21 0815   BP: (!) 178/77 (!) 142/84 (!) 154/78 (!) 165/89   Pulse: 72 72 66 79   Resp: 18 18 18 16   Temp: 98.3 °F (36.8 °C) 98 °F (36.7 °C) 98.4 °F (36.9 °C) 97.8 °F (36.6 °C)   TempSrc: Oral Oral Oral Oral   SpO2:  94% 95% 96%   Weight:   118 lb 9.6 oz (53.8 kg)    Height:               Counseling: The emergency provider has spoken with the patient and discussed todays results, in addition to providing specific details for the plan of care and counseling regarding the diagnosis and prognosis.   Questions are answered at this time and they are agreeable with the plan.       --------------------------------- IMPRESSION AND DISPOSITION ---------------------------------    IMPRESSION  1. Syncope and collapse        DISPOSITION  Disposition: Admission to telemetry  Patient condition is fair    NOTE: This report was transcribed using voice recognition software.  Every effort was made to ensure accuracy; however, inadvertent computerized transcription errors may be present        Philip Bingham MD  08/27/21 1525

## 2021-08-26 DIAGNOSIS — R00.1 BRADYCARDIA: ICD-10-CM

## 2021-08-26 DIAGNOSIS — R42 DIZZINESS: Primary | ICD-10-CM

## 2021-08-26 LAB
ADENOVIRUS BY PCR: NOT DETECTED
ANION GAP SERPL CALCULATED.3IONS-SCNC: 8 MMOL/L (ref 7–16)
BORDETELLA PARAPERTUSSIS BY PCR: NOT DETECTED
BORDETELLA PERTUSSIS BY PCR: NOT DETECTED
BUN BLDV-MCNC: 8 MG/DL (ref 6–23)
CALCIUM SERPL-MCNC: 9.2 MG/DL (ref 8.6–10.2)
CHLAMYDOPHILIA PNEUMONIAE BY PCR: NOT DETECTED
CHLORIDE BLD-SCNC: 104 MMOL/L (ref 98–107)
CO2: 27 MMOL/L (ref 22–29)
CORONAVIRUS 229E BY PCR: NOT DETECTED
CORONAVIRUS HKU1 BY PCR: NOT DETECTED
CORONAVIRUS NL63 BY PCR: NOT DETECTED
CORONAVIRUS OC43 BY PCR: NOT DETECTED
CORTISOL TOTAL: 9.44 MCG/DL (ref 2.68–18.4)
CREAT SERPL-MCNC: 0.7 MG/DL (ref 0.5–1)
GFR AFRICAN AMERICAN: >60
GFR NON-AFRICAN AMERICAN: >60 ML/MIN/1.73
GLUCOSE BLD-MCNC: 98 MG/DL (ref 74–99)
HCT VFR BLD CALC: 39.2 % (ref 34–48)
HEMOGLOBIN: 13 G/DL (ref 11.5–15.5)
HUMAN METAPNEUMOVIRUS BY PCR: NOT DETECTED
HUMAN RHINOVIRUS/ENTEROVIRUS BY PCR: NOT DETECTED
INFLUENZA A BY PCR: NOT DETECTED
INFLUENZA B BY PCR: NOT DETECTED
MCH RBC QN AUTO: 30.9 PG (ref 26–35)
MCHC RBC AUTO-ENTMCNC: 33.2 % (ref 32–34.5)
MCV RBC AUTO: 93.1 FL (ref 80–99.9)
MYCOPLASMA PNEUMONIAE BY PCR: NOT DETECTED
PARAINFLUENZA VIRUS 1 BY PCR: NOT DETECTED
PARAINFLUENZA VIRUS 2 BY PCR: NOT DETECTED
PARAINFLUENZA VIRUS 3 BY PCR: NOT DETECTED
PARAINFLUENZA VIRUS 4 BY PCR: NOT DETECTED
PDW BLD-RTO: 13.2 FL (ref 11.5–15)
PLATELET # BLD: 174 E9/L (ref 130–450)
PMV BLD AUTO: 9.7 FL (ref 7–12)
POTASSIUM REFLEX MAGNESIUM: 4.2 MMOL/L (ref 3.5–5)
PROCALCITONIN: 0.04 NG/ML (ref 0–0.08)
PROCALCITONIN: 0.07 NG/ML (ref 0–0.08)
RBC # BLD: 4.21 E12/L (ref 3.5–5.5)
RESPIRATORY SYNCYTIAL VIRUS BY PCR: NOT DETECTED
SARS-COV-2, PCR: NOT DETECTED
SODIUM BLD-SCNC: 139 MMOL/L (ref 132–146)
T4 FREE: 1.13 NG/DL (ref 0.93–1.7)
TOTAL CK: 284 U/L (ref 20–180)
TROPONIN, HIGH SENSITIVITY: 40 NG/L (ref 0–9)
TSH SERPL DL<=0.05 MIU/L-ACNC: 2.15 UIU/ML (ref 0.27–4.2)
WBC # BLD: 5.9 E9/L (ref 4.5–11.5)

## 2021-08-26 PROCEDURE — 36415 COLL VENOUS BLD VENIPUNCTURE: CPT

## 2021-08-26 PROCEDURE — 80048 BASIC METABOLIC PNL TOTAL CA: CPT

## 2021-08-26 PROCEDURE — 6360000002 HC RX W HCPCS: Performed by: INTERNAL MEDICINE

## 2021-08-26 PROCEDURE — 2580000003 HC RX 258: Performed by: INTERNAL MEDICINE

## 2021-08-26 PROCEDURE — 96367 TX/PROPH/DG ADDL SEQ IV INF: CPT

## 2021-08-26 PROCEDURE — 84439 ASSAY OF FREE THYROXINE: CPT

## 2021-08-26 PROCEDURE — 0202U NFCT DS 22 TRGT SARS-COV-2: CPT

## 2021-08-26 PROCEDURE — 85027 COMPLETE CBC AUTOMATED: CPT

## 2021-08-26 PROCEDURE — 84443 ASSAY THYROID STIM HORMONE: CPT

## 2021-08-26 PROCEDURE — 99202 OFFICE O/P NEW SF 15 MIN: CPT | Performed by: INTERNAL MEDICINE

## 2021-08-26 PROCEDURE — G0378 HOSPITAL OBSERVATION PER HR: HCPCS

## 2021-08-26 PROCEDURE — 96372 THER/PROPH/DIAG INJ SC/IM: CPT

## 2021-08-26 PROCEDURE — 84145 PROCALCITONIN (PCT): CPT

## 2021-08-26 PROCEDURE — 6370000000 HC RX 637 (ALT 250 FOR IP): Performed by: NURSE PRACTITIONER

## 2021-08-26 PROCEDURE — 99220 PR INITIAL OBSERVATION CARE/DAY 70 MINUTES: CPT | Performed by: INTERNAL MEDICINE

## 2021-08-26 PROCEDURE — 82550 ASSAY OF CK (CPK): CPT

## 2021-08-26 PROCEDURE — 87449 NOS EACH ORGANISM AG IA: CPT

## 2021-08-26 PROCEDURE — 2580000003 HC RX 258: Performed by: NURSE PRACTITIONER

## 2021-08-26 PROCEDURE — 6360000002 HC RX W HCPCS: Performed by: NURSE PRACTITIONER

## 2021-08-26 PROCEDURE — 82533 TOTAL CORTISOL: CPT

## 2021-08-26 RX ADMIN — SODIUM CHLORIDE, PRESERVATIVE FREE 10 ML: 5 INJECTION INTRAVENOUS at 19:49

## 2021-08-26 RX ADMIN — ATORVASTATIN CALCIUM 40 MG: 40 TABLET, FILM COATED ORAL at 08:40

## 2021-08-26 RX ADMIN — AMPICILLIN AND SULBACTAM 3000 MG: 1; 2 INJECTION, POWDER, FOR SOLUTION INTRAMUSCULAR; INTRAVENOUS at 16:26

## 2021-08-26 RX ADMIN — SODIUM CHLORIDE, PRESERVATIVE FREE 10 ML: 5 INJECTION INTRAVENOUS at 08:40

## 2021-08-26 RX ADMIN — PANTOPRAZOLE SODIUM 40 MG: 40 TABLET, DELAYED RELEASE ORAL at 06:11

## 2021-08-26 RX ADMIN — AMPICILLIN AND SULBACTAM 3000 MG: 1; 2 INJECTION, POWDER, FOR SOLUTION INTRAMUSCULAR; INTRAVENOUS at 19:49

## 2021-08-26 RX ADMIN — LISINOPRIL 10 MG: 10 TABLET ORAL at 08:40

## 2021-08-26 RX ADMIN — ENOXAPARIN SODIUM 40 MG: 40 INJECTION SUBCUTANEOUS at 08:40

## 2021-08-26 RX ADMIN — AMPICILLIN AND SULBACTAM 3000 MG: 1; 2 INJECTION, POWDER, FOR SOLUTION INTRAMUSCULAR; INTRAVENOUS at 09:57

## 2021-08-26 ASSESSMENT — PAIN SCALES - GENERAL
PAINLEVEL_OUTOF10: 0

## 2021-08-26 NOTE — CONSULTS
Troponin levels are mildly elevated but the trend is flat. No acute signs of ischemia. No need for ischemic work-up at this point. We will continue to monitor. We will follow up on echocardiogram as mentioned above. Inpatient Cardiology Consultation      Reason for Consult:  Syncopal episode, sinus bradycardia and 1st degree AVB    Consulting Physician: Dr Rice Prader    Requesting Physician:  Dr Josefina Skaggs    Date of Consultation: 8/26/2021    HISTORY OF PRESENT ILLNESS: 79 yo  female not previously known to any cardiologist.  PMH: HTN, HLD, estrogen replacement, LLE DVT in 2019 remains on [address] [place] [address] Road (per PCP), unsteady gait ambulates with walker. Was sitting in chair eating breakfast became dizzy and hot thinks she may have passed out (unwitnessed) while daughter was outside watering flowers. Was found by daughter with her head down but was awake. EMS was summoned. Patient was out sitting on porch for approximately 1 hour on Wednesday. She drinks very little due to urinary incontinence  [facility] BP upon arrival 161/78 HR 60's SR. EKG SB with 1st degree AVB. CTA Brain/neck nothing acute. Na 133, K+ 4.2-->4.2, Bun/Cr 10/0.8, troponin 37-->37-->40 with , TSH 2.150, Free T4 1.13, CBC/LFT's WNL, + UTI   500 cc NSS in ED  No reported CP or SOB. Spoke with daughter Kt Perez [phone] due to patient unable to hear speaking on phone (in [facility] rule out)      Please note: past medical records were reviewed per electronic medical record (EMR) - see detailed reports under Past Medical/ Surgical History. Past Medical History:    1. Smoked for a few years in her 19's  2. HTN  3. HLD  4. On estrogen replacement  5. GERD  6. \"Chronic\" RLE DVT on [address]  7. [place] wears hearing aides  8. Macular degeneration  9. Unsteady gait ambulates with walker  10. Chronic back pain with sciatica  11. Osteoarthritis  12. Urinary incontinence   13. Depression/Anxiety  14. 2018 Limited TTE Dr Lewis Wall: EF 65%.  TAPSE 2.2 no PRN  acetaminophen (TYLENOL) tablet 650 mg, 650 mg, Oral, Q6H PRN **OR** acetaminophen (TYLENOL) suppository 650 mg, 650 mg, Rectal, Q6H PRN  perflutren lipid microspheres (DEFINITY) injection 1.65 mg, 1.5 mL, IntraVENous, ONCE PRN  0.9 % sodium chloride infusion, , IntraVENous, Continuous  [Held by provider] amitriptyline (ELAVIL) tablet 25 mg, 25 mg, Oral, Nightly  [Held by provider] estradiol (ESTRACE) tablet 1 mg, 1 mg, Oral, Daily  [Held by provider] gabapentin (NEURONTIN) capsule 300 mg, 300 mg, Oral, BID  linaclotide (LINZESS) capsule 145 mcg, 145 mcg, Oral, QAM AC  lisinopril (PRINIVIL;ZESTRIL) tablet 10 mg, 10 mg, Oral, Daily  pantoprazole (PROTONIX) tablet 40 mg, 40 mg, Oral, QAM AC  atorvastatin (LIPITOR) tablet 40 mg, 40 mg, Oral, Daily    Allergies:  Codeine: N/V    Social History:    Smoked for a few years in her 35'O  Denies ETOH/Illicit Drugs  Drinks very little due to urinary incontinence. Activity; Lives with daughter in 1 story home with basement. Mostly sedentary. Ambulates with walker. Code Status; Full Code      Family History: Non contributory secondary to age      REVIEW OF SYSTEMS:     · Constitutional: Denies fatigue, fevers, chills or night sweats  · Eyes: Denies visual changes or drainage  · ENT: Denies headaches or hearing loss. No mouth sores or sore throat. No epistaxis   · Cardiovascular: Denies chest pain, pressure or palpitations. No lower extremity swelling. · Respiratory: Denies QUEZADA, cough, orthopnea or PND. No hemoptysis   · Gastrointestinal: Denies hematemesis or anorexia. No hematochezia or melena    · Genitourinary: Denies urgency, dysuria or hematuria. · Musculoskeletal: + unsteady gait uses walker. + chronic back pain with sciatica. · Integumentary: Denies rash, hives or pruritis   · Neurological: + dizziness/hot. Denies headaches or seizures. No numbness or tingling  · Psychiatric: Denies anxiety or depression. · Endocrine: Denies temperature intolerance.  No recent weight change. .  · Hematologic/Lymphatic: Denies abnormal bruising or bleeding. No swollen lymph nodes    PHYSICAL EXAM:   BP (!) 161/71   Pulse 77   Temp 97.9 °F (36.6 °C) (Oral)   Resp 18   Ht 5' 5\" (1.651 m)   Wt 134 lb (60.8 kg)   SpO2 96%   BMI 22.30 kg/m²    Physical Exam not done due to COVID isolation in order to preserve PPE and limit exposure. DATA:    ECG SB with 1st degree AVB. NSSTT wave changes  Tele strips: SR 60's    Diagnostic:    See HPI    Labs:   CBC:   Recent Labs     08/25/21  1402 08/26/21  0410   WBC 5.6 5.9   HGB 13.8 13.0   HCT 41.6 39.2    174     BMP:   Recent Labs     08/25/21  1402 08/26/21  0410    139   K 4.2 4.2   CO2 27 27   BUN 10 8   CREATININE 0.8 0.7   LABGLOM >60 >60   CALCIUM 9.3 9.2     CARDIAC ENZYMES:  Recent Labs     08/25/21  1402 08/25/21  1813 08/25/21  2345   TROPHS 37* 37* 40*       LIVER PROFILE:  Recent Labs     08/25/21  1402   AST 22   ALT 13   LABALBU 4.0     ASSESSMENT:  1. Dizziness ? Syncope. Orthostatics negative. 2. Intermittent SB with 1st degree AVB on Elavil and Neurontin  3. Elevated troponin no reported CP, troponin elevation not consistent with ACS  4. Questionable pneumonia   5. UTI  6. HTN  7. HLD  8. Estrogen replacement  9. Hx LLE DVT on Eliquis  10. GERD  11. Chronic back pain with sciacta  12. Ambulates with walker  13. Urinary incontinence  14. [facility]      PLAN:  1. Continue Telemetry overnight  2. Check TFT's  3. Will read TTE when done  4. Assess HR with ambulation in hallway (looking for chronotropic incompetence)  5. 48 hour Holter after discharge (office to arrange)  6. Further recommendations to follow      Electronically signed by Nani Lopez.  FARA Marin on 8/26/2021 at 1:54 PM

## 2021-08-26 NOTE — H&P
[address] [facility]   History and Physical      CHIEF COMPLAINT: Syncope    Reason for Admission: Syncopal episode    History Obtained From: Patient    HISTORY OF PRESENT ILLNESS:      The patient is a 80 y.o. female of Andreina Rodriguez MD with significant past medical history of hypertension, hyperlipidemia and recurrent falls. Patient presented from home with her daughter with complaints of syncopal episode. Patient noted that she was sitting in her recliner having breakfast when she suddenly passed out. She lives with her daughter however she was out watering the plants when that happened. Patient denied any dizziness, chest pain, palpitation or shortness of breath prior to passing out. She is also denied any seizure-like activity, tongue biting, stool or urinary incontinence after the episode.    -In the ED, patient was hemodynamically stable  -Labs were significant for elevated troponin of 37, EKG showed sinus bradycardia with VR 59, and first-degree AV block. -CXR was significant for bibasilar opacities C/W pneumonia versus atelectasis. All labs personally reviewed   All imaging personally reviewed     Past Medical History:        Diagnosis Date    GERD (gastroesophageal reflux disease)     Hyperlipidemia     Hypertension      Past Surgical History:        Procedure Laterality Date    BLADDER REPAIR      BREAST LUMPECTOMY      SKIN CANCER EXCISION      head/face areas         Medications Prior to Admission:    Medications Prior to Admission: apixaban (ELIQUIS) 2.5 MG TABS tablet, Take 2.5 mg by mouth 2 times daily  lisinopril (PRINIVIL;ZESTRIL) 10 MG tablet, Take 1 tablet by mouth daily (Patient taking differently: Take 20 mg by mouth 2 times daily )  linaclotide (LINZESS) 145 MCG capsule, Take 290 mcg by mouth every morning (before breakfast)   estradiol (ESTRACE) 1 MG tablet, Take 1 mg by mouth daily.     simvastatin (ZOCOR) 40 MG tablet, Take 40 mg by mouth daily   gabapentin

## 2021-08-27 LAB
ANION GAP SERPL CALCULATED.3IONS-SCNC: 9 MMOL/L (ref 7–16)
BASOPHILS ABSOLUTE: 0.03 E9/L (ref 0–0.2)
BASOPHILS RELATIVE PERCENT: 0.6 % (ref 0–2)
BUN BLDV-MCNC: 6 MG/DL (ref 6–23)
CALCIUM SERPL-MCNC: 8.4 MG/DL (ref 8.6–10.2)
CHLORIDE BLD-SCNC: 105 MMOL/L (ref 98–107)
CO2: 25 MMOL/L (ref 22–29)
CREAT SERPL-MCNC: 0.6 MG/DL (ref 0.5–1)
EOSINOPHILS ABSOLUTE: 0.08 E9/L (ref 0.05–0.5)
EOSINOPHILS RELATIVE PERCENT: 1.6 % (ref 0–6)
GFR AFRICAN AMERICAN: >60
GFR NON-AFRICAN AMERICAN: >60 ML/MIN/1.73
GLUCOSE BLD-MCNC: 84 MG/DL (ref 74–99)
HCT VFR BLD CALC: 43.1 % (ref 34–48)
HEMOGLOBIN: 14.2 G/DL (ref 11.5–15.5)
IMMATURE GRANULOCYTES #: 0.01 E9/L
IMMATURE GRANULOCYTES %: 0.2 % (ref 0–5)
L. PNEUMOPHILA SEROGP 1 UR AG: NORMAL
LV EF: 60 %
LVEF MODALITY: NORMAL
LYMPHOCYTES ABSOLUTE: 0.85 E9/L (ref 1.5–4)
LYMPHOCYTES RELATIVE PERCENT: 17.5 % (ref 20–42)
MAGNESIUM: 1.5 MG/DL (ref 1.6–2.6)
MCH RBC QN AUTO: 30.6 PG (ref 26–35)
MCHC RBC AUTO-ENTMCNC: 32.9 % (ref 32–34.5)
MCV RBC AUTO: 92.9 FL (ref 80–99.9)
MONOCYTES ABSOLUTE: 0.4 E9/L (ref 0.1–0.95)
MONOCYTES RELATIVE PERCENT: 8.2 % (ref 2–12)
NEUTROPHILS ABSOLUTE: 3.5 E9/L (ref 1.8–7.3)
NEUTROPHILS RELATIVE PERCENT: 71.9 % (ref 43–80)
PDW BLD-RTO: 13.3 FL (ref 11.5–15)
PLATELET # BLD: 181 E9/L (ref 130–450)
PMV BLD AUTO: 9.3 FL (ref 7–12)
POTASSIUM REFLEX MAGNESIUM: 3.3 MMOL/L (ref 3.5–5)
RBC # BLD: 4.64 E12/L (ref 3.5–5.5)
SODIUM BLD-SCNC: 139 MMOL/L (ref 132–146)
STREP PNEUMONIAE ANTIGEN, URINE: NORMAL
WBC # BLD: 4.9 E9/L (ref 4.5–11.5)

## 2021-08-27 PROCEDURE — 6360000002 HC RX W HCPCS: Performed by: INTERNAL MEDICINE

## 2021-08-27 PROCEDURE — 6370000000 HC RX 637 (ALT 250 FOR IP): Performed by: INTERNAL MEDICINE

## 2021-08-27 PROCEDURE — 2580000003 HC RX 258: Performed by: NURSE PRACTITIONER

## 2021-08-27 PROCEDURE — 97161 PT EVAL LOW COMPLEX 20 MIN: CPT

## 2021-08-27 PROCEDURE — 97165 OT EVAL LOW COMPLEX 30 MIN: CPT

## 2021-08-27 PROCEDURE — 96372 THER/PROPH/DIAG INJ SC/IM: CPT

## 2021-08-27 PROCEDURE — G0378 HOSPITAL OBSERVATION PER HR: HCPCS

## 2021-08-27 PROCEDURE — 96375 TX/PRO/DX INJ NEW DRUG ADDON: CPT

## 2021-08-27 PROCEDURE — 36415 COLL VENOUS BLD VENIPUNCTURE: CPT

## 2021-08-27 PROCEDURE — 85025 COMPLETE CBC W/AUTO DIFF WBC: CPT

## 2021-08-27 PROCEDURE — 99226 PR SBSQ OBSERVATION CARE/DAY 35 MINUTES: CPT | Performed by: INTERNAL MEDICINE

## 2021-08-27 PROCEDURE — 2580000003 HC RX 258: Performed by: INTERNAL MEDICINE

## 2021-08-27 PROCEDURE — 2500000003 HC RX 250 WO HCPCS: Performed by: FAMILY MEDICINE

## 2021-08-27 PROCEDURE — 96367 TX/PROPH/DG ADDL SEQ IV INF: CPT

## 2021-08-27 PROCEDURE — 93306 TTE W/DOPPLER COMPLETE: CPT

## 2021-08-27 PROCEDURE — 99202 OFFICE O/P NEW SF 15 MIN: CPT | Performed by: INTERNAL MEDICINE

## 2021-08-27 PROCEDURE — 83735 ASSAY OF MAGNESIUM: CPT

## 2021-08-27 PROCEDURE — 6370000000 HC RX 637 (ALT 250 FOR IP): Performed by: NURSE PRACTITIONER

## 2021-08-27 PROCEDURE — 80048 BASIC METABOLIC PNL TOTAL CA: CPT

## 2021-08-27 PROCEDURE — 6360000002 HC RX W HCPCS: Performed by: NURSE PRACTITIONER

## 2021-08-27 RX ORDER — HYDRALAZINE HYDROCHLORIDE 20 MG/ML
10 INJECTION INTRAMUSCULAR; INTRAVENOUS ONCE
Status: COMPLETED | OUTPATIENT
Start: 2021-08-27 | End: 2021-08-27

## 2021-08-27 RX ORDER — POTASSIUM CHLORIDE 20 MEQ/1
40 TABLET, EXTENDED RELEASE ORAL ONCE
Status: COMPLETED | OUTPATIENT
Start: 2021-08-27 | End: 2021-08-27

## 2021-08-27 RX ORDER — LABETALOL HYDROCHLORIDE 5 MG/ML
10 INJECTION, SOLUTION INTRAVENOUS EVERY 6 HOURS
Status: DISCONTINUED | OUTPATIENT
Start: 2021-08-27 | End: 2021-08-28 | Stop reason: HOSPADM

## 2021-08-27 RX ORDER — COSYNTROPIN 0.25 MG/ML
250 INJECTION, POWDER, FOR SOLUTION INTRAMUSCULAR; INTRAVENOUS ONCE
Status: COMPLETED | OUTPATIENT
Start: 2021-08-28 | End: 2021-08-28

## 2021-08-27 RX ADMIN — AMPICILLIN AND SULBACTAM 3000 MG: 1; 2 INJECTION, POWDER, FOR SOLUTION INTRAMUSCULAR; INTRAVENOUS at 08:52

## 2021-08-27 RX ADMIN — PANTOPRAZOLE SODIUM 40 MG: 40 TABLET, DELAYED RELEASE ORAL at 05:50

## 2021-08-27 RX ADMIN — LISINOPRIL 10 MG: 10 TABLET ORAL at 08:45

## 2021-08-27 RX ADMIN — LABETALOL HYDROCHLORIDE 10 MG: 5 INJECTION INTRAVENOUS at 23:37

## 2021-08-27 RX ADMIN — POTASSIUM CHLORIDE 40 MEQ: 1500 TABLET, EXTENDED RELEASE ORAL at 14:16

## 2021-08-27 RX ADMIN — SODIUM CHLORIDE, PRESERVATIVE FREE 10 ML: 5 INJECTION INTRAVENOUS at 20:37

## 2021-08-27 RX ADMIN — ENOXAPARIN SODIUM 40 MG: 40 INJECTION SUBCUTANEOUS at 08:45

## 2021-08-27 RX ADMIN — HYDRALAZINE HYDROCHLORIDE 10 MG: 20 INJECTION INTRAMUSCULAR; INTRAVENOUS at 20:37

## 2021-08-27 RX ADMIN — AMPICILLIN AND SULBACTAM 3000 MG: 1; 2 INJECTION, POWDER, FOR SOLUTION INTRAMUSCULAR; INTRAVENOUS at 02:35

## 2021-08-27 RX ADMIN — SODIUM CHLORIDE, PRESERVATIVE FREE 10 ML: 5 INJECTION INTRAVENOUS at 08:46

## 2021-08-27 RX ADMIN — ATORVASTATIN CALCIUM 40 MG: 40 TABLET, FILM COATED ORAL at 08:45

## 2021-08-27 ASSESSMENT — PAIN SCALES - GENERAL
PAINLEVEL_OUTOF10: 0
PAINLEVEL_OUTOF10: 0

## 2021-08-27 NOTE — PROGRESS NOTES
Subjective:  Patient was seen and examined this AM.  Awake, alert and oriented X3. States she feels much better today. No new complaints or acute overnight events  -Seen by cardiology, pending TTE. Recommended assessing HR with ambulation and 48-hour Holter monitor on discharge. DC Elavil and gabapentin on discharge  -Noted was cortisol level of 1.13, rule out adrenal insufficiency. Cosyntropin stim test    Objective:    BP (!) 165/89   Pulse 79   Temp 97.8 °F (36.6 °C) (Oral)   Resp 16   Ht 5' 5\" (1.651 m)   Wt 118 lb 9.6 oz (53.8 kg)   SpO2 96%   BMI 19.74 kg/m²     No intake/output data recorded. No intake/output data recorded. General appearance: NAD, conversant  HEENT: AT/NC, MMM  Neck: FROM, supple  Lungs: Clear to auscultation  CV: RRR, no MRGs  Vasc: Radial pulses 2+  Abdomen: Soft, non-tender; no masses or HSM  Extremities: No peripheral edema or digital cyanosis  Skin: no rash, lesions or ulcers  Psych: Alert and oriented to person, place and time  Neuro: Alert and interactive     Recent Labs     08/25/21  1402 08/26/21  0410   WBC 5.6 5.9   HGB 13.8 13.0   HCT 41.6 39.2    174       Recent Labs     08/25/21  1402 08/26/21  0410    139   K 4.2 4.2    104   CO2 27 27   BUN 10 8   CREATININE 0.8 0.7   CALCIUM 9.3 9.2       Assessment:    Principal Problem:    Syncope  Active Problems:    Elevated troponin    Essential hypertension    Mixed hyperlipidemia    Gastroesophageal reflux disease without esophagitis    Pneumonia    UTI (urinary tract infection)    Orthostasis    Syncope and collapse  Resolved Problems:    * No resolved hospital problems.  *    Assessment:  -Syncopal episode, multifactorial  -Sinus bradycardia with first-degree AV block, in the setting of polypharmacy with TCA and gabapentin use  -Low cortisol level, rule out adrenal insufficiency  -Possible bilateral pneumonia, less likely  -Elevated troponin, no chest pain  -Hx hypertension  -Hx Hyperlipidemia  -Hx recurrent falls     PLAN:  -Discontinue amitriptyline and gabapentin on discharge  -Orthostatics negative, follow TTE.   Cardiology recommendations  -Check cosyntropin stim test  -Completed Unasyn for aspiration pneumonia  -Resume home medications  - consult for possible need of placement    DVT Prophylaxis   PT/OT  Discharge planning       Lori River MD  9:30 AM  8/27/2021

## 2021-08-27 NOTE — PROGRESS NOTES
[facility] Quality Flow/Interdisciplinary Rounds Progress Note        Quality Flow Rounds held on August 27, 2021    Disciplines Attending:  Bedside Nurse, ,  and Nursing Unit Leadership    Elvira Russell was admitted on 8/25/2021 12:08 PM    Anticipated Discharge Date:  Expected Discharge Date: 08/28/21    Disposition:    Merritt Score:  Merritt Scale Score: 19    Readmission Risk              Risk of Unplanned Readmission:  0           Discussed patient goal for the day, patient clinical progression, and barriers to discharge. The following Goal(s) of the Day/Commitment(s) have been identified:  cortisol challenge tomorrow, await echo results.       Jignesh Acevedo RN  August 27, 2021

## 2021-08-27 NOTE — PROGRESS NOTES
INPATIENT CARDIOLOGY FOLLOW-UP    Name: Tammy Crum    Age: 80 y.o. Date of Admission: 8/25/2021 12:08 PM    Date of Service: 8/27/2021    Primary Cardiologist: New to us    Chief Complaint: Follow-up for syncopal episode    Interim History:  No new overnight cardiac complaints. Currently with no complaints of CP, SOB, palpitations, dizziness, or lightheadedness. SR on telemetry. No episodes of significant bradycardia. No lightheadedness or dizziness. Has been ambulating without trouble.     Review of Systems:   Negative except as described above    Problem List:  Patient Active Problem List   Diagnosis    Elevated troponin    Multiple contusions of trunk, initial encounter    Electrolyte abnormality    Essential hypertension    Mixed hyperlipidemia    Contusion of thorax    Syncope    Gastroesophageal reflux disease without esophagitis    Pneumonia    UTI (urinary tract infection)    Orthostasis    Syncope and collapse       Current Medications:    Current Facility-Administered Medications:     [START ON 8/28/2021] cosyntropin (CORTROSYN) injection 250 mcg, 250 mcg, IntraVENous, Once, Nhung Gaines MD    0.9 % sodium chloride bolus, 500 mL, IntraVENous, Once, Marnee Cowden, MD    sodium chloride flush 0.9 % injection 5-40 mL, 5-40 mL, IntraVENous, 2 times per day, April MILAD Bernal - CNP, 10 mL at 08/27/21 3380    sodium chloride flush 0.9 % injection 5-40 mL, 5-40 mL, IntraVENous, PRN, April MILAD Bernal - CNP    0.9 % sodium chloride infusion, 25 mL, IntraVENous, PRN, April MILAD Bernal - CNP    enoxaparin (LOVENOX) injection 40 mg, 40 mg, Subcutaneous, Daily, April MILAD Bernal - CNP, 40 mg at 08/27/21 0845    ondansetron (ZOFRAN-ODT) disintegrating tablet 4 mg, 4 mg, Oral, Q8H PRN **OR** ondansetron (ZOFRAN) injection 4 mg, 4 mg, IntraVENous, Q6H PRN, April MILAD Bernal CNP    polyethylene glycol (GLYCOLAX) packet 17 g, 17 g, Oral, Daily PRN, April Storey-Cornelius, APRN - CNP    acetaminophen (TYLENOL) tablet 650 mg, 650 mg, Oral, Q6H PRN **OR** acetaminophen (TYLENOL) suppository 650 mg, 650 mg, Rectal, Q6H PRN, April Storey-Cornelius, APRN - CNP    perflutren lipid microspheres (DEFINITY) injection 1.65 mg, 1.5 mL, IntraVENous, ONCE PRN, April Storey-[place], APRN - CNP    [Held by provider] amitriptyline (ELAVIL) tablet 25 mg, 25 mg, Oral, Nightly, April Storey-Cornelius, APRN - CNP, 25 mg at 08/25/21 2306    [Held by provider] estradiol (ESTRACE) tablet 1 mg, 1 mg, Oral, Daily, April Storey-Cornelius, APRN - CNP    [Held by provider] gabapentin (NEURONTIN) capsule 300 mg, 300 mg, Oral, BID, April Storey-Cornelius, APRN - CNP, 300 mg at 08/25/21 2306    lisinopril (PRINIVIL;ZESTRIL) tablet 10 mg, 10 mg, Oral, Daily, April Storey-Cornelius, APRN - CNP, 10 mg at 08/27/21 0845    pantoprazole (PROTONIX) tablet 40 mg, 40 mg, Oral, QAM AC, April Storey-Cornelius, APRN - CNP, 40 mg at 08/27/21 0550    atorvastatin (LIPITOR) tablet 40 mg, 40 mg, Oral, Daily, April Storey-Cornelius, APRN - CNP, 40 mg at 08/27/21 0845    Physical Exam:  BP (!) 165/89   Pulse 79   Temp 97.8 °F (36.6 °C) (Oral)   Resp 16   Ht 5' 5\" (1.651 m)   Wt 118 lb 9.6 oz (53.8 kg)   SpO2 96%   BMI 19.74 kg/m²   Wt Readings from Last 3 Encounters:   08/27/21 118 lb 9.6 oz (53.8 kg)   07/26/18 134 lb 8 oz (61 kg)   03/26/18 150 lb (68 kg)     Appearance: Awake, alert, no acute respiratory distress  Skin: Intact, no rash  Head: Normocephalic, atraumatic  Eyes: EOMI, no conjunctival erythema. Very hard of hearing  ENMT: No pharyngeal erythema, MMM, no rhinorrhea  Neck: Supple, no elevated JVP, no carotid bruits  Lungs: Clear to auscultation bilaterally. No wheezes, rales, or rhonchi.   Cardiac: PMI nondisplaced, Regular rhythm with a normal rate, S1 & S2 normal, no murmurs  Abdomen: Soft, nontender, +bowel sounds  Extremities: Moves all extremities x 4, no lower extremity edema  Neurologic: No focal motor deficits apparent, normal mood and affect  Peripheral Pulses: Intact posterior tibial pulses bilaterally    Intake/Output:  No intake or output data in the 24 hours ending 08/27/21 1503  No intake/output data recorded. Laboratory Tests:  Recent Labs     08/25/21  1402 08/26/21  0410 08/27/21  1139    139 139   K 4.2 4.2 3.3*    104 105   CO2 27 27 25   BUN 10 8 6   CREATININE 0.8 0.7 0.6   GLUCOSE 148* 98 84   CALCIUM 9.3 9.2 8.4*     Lab Results   Component Value Date    MG 1.5 08/27/2021     Recent Labs     08/25/21  1402   ALKPHOS 75   ALT 13   AST 22   PROT 6.1*   BILITOT 0.4   LABALBU 4.0     Recent Labs     08/25/21  1402 08/26/21  0410 08/27/21  1139   WBC 5.6 5.9 4.9   RBC 4.40 4.21 4.64   HGB 13.8 13.0 14.2   HCT 41.6 39.2 43.1   MCV 94.5 93.1 92.9   MCH 31.4 30.9 30.6   MCHC 33.2 33.2 32.9   RDW 13.1 13.2 13.3    174 181   MPV 9.5 9.7 9.3     Lab Results   Component Value Date    CKTOTAL 284 (H) 08/26/2021    CKMB 14.5 (H) 07/25/2018    TROPONINI 0.06 (H) 07/25/2018    TROPONINI 0.08 (H) 07/24/2018     Lab Results   Component Value Date    INR 1.1 07/24/2018    PROTIME 12.5 (H) 07/24/2018     Lab Results   Component Value Date    TSH 2.150 08/26/2021     No results found for: LABA1C  No results found for: EAG  No results found for: CHOL  No results found for: TRIG  No results found for: HDL  No results found for: LDLCALC, LDLCHOLESTEROL  No results found for: LABVLDL, VLDL  No results found for: CHOLHDLRATIO  No results for input(s): PROBNP in the last 72 hours. Cardiac Tests:      Telemetry: Normal sinus rhythm. Echocardiogram: Done on 8/27/2021:   Left ventricular size is grossly normal. Ejection fraction is visually   estimated at 60%. Grade II diastolic dysfunction. There is concentric LVH. Normal right ventricular size and function. Left atrium is of normal size. Normal right atrium size.    There is mild pulmonary hypertension with a PA systolic pressure of 48 mm   Hg. Less than mild valvular regurgitation/ stenosis. Compared to prior echo from 2018, no changes noted. PA pressure was not   reported on that study. ----------------------------------------------------------------------------------------------------------------------------------------------------------------  ASSESSMENT:  1. Dizziness ? Syncope. Orthostatics negative. 2. Intermittent SB with 1st degree AVB on Elavil and Neurontin  3. Elevated troponin no reported CP, troponin elevation not consistent with ACS  4. Questionable pneumonia   5. UTI  6. HTN  7. HLD  8. Estrogen replacement  9. Hx LLE DVT on Eliquis  10. GERD  11. Chronic back pain with sciacta  12. Ambulates with walker  13. Urinary incontinence        PLAN:  1. No arrhythmias on telemetry. No significant bradycardia. Elavil and Neurontin were held. Ambulating without trouble without any signs of chronotropic incompetence. Echocardiogram as above. Essentially normal biventricular systolic function. Does have grade 2 diastolic dysfunction. Appears stable and euvolemic. 2. 72 hour Holter after discharge (office to arrange)  3. Pressures have been elevated. She is on home lisinopril 10 mg daily. Will not make any changes as an inpatient. Due to presentation with syncope. Will manage further as an outpatient. Avoid xenia blocking agents. Noted that Elavil has been discontinued. No need for pacemaker implantation at this point. Further management as per primary team.  I will sign off at this point. Please not hesitate to call me with any questions or concerns. I will follow up on the Holter monitor results. She should follow-up in my office in 4 weeks. Gage Sewell MD, [facility]    NOTE: This report was transcribed using voice recognition software.  Every effort was made to ensure accuracy; however, inadvertent computerized transcription errors may be

## 2021-08-27 NOTE — PROGRESS NOTES
Occupational Therapy  OCCUPATIONAL THERAPY INITIAL EVALUATION  [facility] [address]  [address] [facility], [place]    Date: 2021     Patient Name: Jian Edmonds  MRN: [id number]  : 3/13/1927  Room: [address] [place] [address]-    Evaluating OT: Julius Maguire [id number]    Referring Provider: MILAD Hussein CNP  Specific Provider Orders/Date: \"OT eval and treatt\" - 2021    Diagnosis: Syncope and collapse [R55]      Pertinent Medical History: HTN, GERD    Precautions: fall risk, bed alarm, hearing impairment, skin integrity    Assessment of Current Deficits:    [x] Functional mobility   [x]ADLs  [x] Strength               [x]Cognition   [x] Functional transfers   [x] IADLs         [x] Safety Awareness   [x]Endurance   [] Fine Coordination              [x] Balance      [] Vision/perception   [x]Sensation    []Gross Motor Coordination  [] ROM  [] Delirium                   [] Motor Control     OT PLAN OF CARE   OT POC is based on physician orders, patient diagnosis, and results of clinical assessment.   Frequency/Duration 1-3 days/week for 2 weeks PRN   Specific OT Treatment Interventions to Include:   * Instruction/training on adapted ADL techniques and AE recommendations to increase functional independence within precautions       * Training on energy conservation strategies, correct breathing pattern and techniques to improve independence/tolerance for self-care routine  * Functional transfer/mobility training/DME recommendations for increased independence, safety, and fall prevention  * Patient/Family education to increase follow through with safety techniques and functional independence  * Recommendation of environmental modifications for increased safety with functional transfers/mobility and ADLs  * Therapeutic exercise to improve motor endurance, ROM, and functional strength for ADLs/functional transfers  * Therapeutic activities to facilitate/challenge dynamic balance, stand tolerance for increased safety and independence with ADLs  * Neuro-muscular re-education: facilitation of righting/equilibrium reactions, midline orientation, scapular stability/mobility, normalization of muscle tone, and facilitation of volitional active controled movement    Recommended Adaptive Equipment: TBD    Home Living: Patient lives with her daughter in a one-floor home. Bathroom Setup: tub (with seat)  Equipment Owned: rollator    Prior Level of Function (PLOF): Patient reported that she was independent with ADLs and some household-based IADLs. Patient's daughter assists with IADLs, as needed. Patient was independent with functional mobility (with rollator) prior to this hospitalization. Driving: No    Pain Level: Patient denied experiencing pain. Cognition: Patient alert and oriented grossly. WFL command follow demonstrated. Patient pleasant and cooperative; patient motivated to return to [facility] and home environment. Memory: WFL  Sequencing: WFL  Problem Solving: WFL grossly  Judgement/Safety: WFL grossly    Functional Assessment:  AM-PAC Daily Activity Raw Score: 19/24   Initial Eval Status  Date: 8/27/2021 Treatment Status  Date:  Short Term Goals = Long Term Goals   Feeding Independent     Grooming SBA for hand hygiene and oral care tasks while standing at sink. Mod I  (seated/standing at sink)   UB Dressing SBA  Mod I  (including item retrieval)   LB Dressing Min A  Mod I / Independent - with use of AE, as needed/appropriate   Bathing Min A  Mod I / Independent - with use of AE/DME, as needed/appropriate   Toileting CGA with toilet transfer (with use of R wall-mounted grab bar). CGA for clothing management. Mod I / Independent   Bed Mobility  Sit-to-Supine: SBA      Functional Transfers Sit-to-Stand: CGA   from toilet  Independent   Functional Mobility SBA   (with walker) within patient's room and bathroom.   Mod I with functional mobility (with device, as needed/appropriate) in order to maximize independence with ADLs/IADLs and other functional tasks. Balance Sitting: Good  (at EOB)  Standing: Fair  (with walker)  Fair+ dynamic standing balance during completion of ADLs/IADLs and other functional tasks. Activity Tolerance Fair+  Patient tolerated~8 minutes of static standing at sinkside for completion of grooming tasks. Patient will demonstrate Good understanding and consistent implementation of energy conservation techniques and work simplification techniques into ADL/IADL routines. Visual/  Perceptual WFL     N/A     Additional Long-Term Goal: Patient will increase functional independence to PLOF in order to allow patient to live in least restrictive environment. Strength: ROM: Additional Information:    R UE  3+/5 WFL grossly    L UE 3+/5 WFL grossly      Hearing: Impaired - wears bilateral hearing aids  Sensation: Patient denied experiencing numbness/tingling in B UEs. Tone: WFL  Edema: No    Comments: RN approved patient's participation in [address] [place] [address] activities. Upon arrival, patient seated on toilet with call light activated. At end of session, patient supine in bed (per patient preference) with call light and phone within reach, bed alarm activated, nursing present, and all lines and tubes intact. Patient would benefit from continued skilled OT to increase safety and independence with completion of ADL/IADL tasks for functional independence and quality of life. Treatment: OT treatment provided this date included:   Instruction/training on safe functional mobility/transfer techniques. Patient education provided regardin) potential benefits of OT services during acute care hospitalization. Patient indicated [address] understanding. Further skilled OT treatment indicated to increase patient's safety and independence with completion of ADL/IADL tasks in order to maximize patient's functional independence and quality of life.     Rehab Potential: Good for established goals. Patient / Family Goal: Patient indicated that she anticipates returning home upon discharge. Patient and/or family were instructed on functional diagnosis, prognosis/goals, and OT plan of care. Demonstrated Fair understanding. Eval Complexity: Low    Time In: 1355  Time Out: 1420  Total Treatment Time: 10 minutes      Minutes Units   OT Eval Low 22965 15 1   OT Eval Medium 66530     OT Eval High 99915     OT Re-Eval [id number]     Therapeutic Ex 78238     Therapeutic Activities 93534     ADL/Self Care 88849 10 1   Orthotic Management 11574     Neuro Re-Ed 17479     Non-Billable Time N/A ---     Evaluation time includes thorough review of current medical information, gathering information on past medical history/social history and prior level of function, completion of standardized testing/informal observation of tasks, assessment of data, and education on plan of care and goals. Zoya Navarrete, OTR/L  License Number: [id number]

## 2021-08-27 NOTE — PROGRESS NOTES
with call light left by patient. Chair/bed alarm: bed alarm was activated. Pt's/ family goals   1. To go home. Patient and or family understand(s) diagnosis, prognosis, and plan of care. PHYSICAL THERAPY PLAN OF CARE:    PT POC is established based on physician order and patient diagnosis     Referring provider/PT Order:  PT eval and treat  Diagnosis:  Syncope and collapse [R55]  Specific instructions for next treatment:  Amb pt with ww    Current Treatment Recommendations:     [x] Strengthening to improve independence with functional mobility   [] ROM to improve ROM and decrease spasm and pain which will help promote independence with functional mobility   [x] Balance Training to improve static/dynamic balance and to reduce fall risk  [x] Endurance Training to improve activity tolerance during functional mobility   [x] Transfer Training to improve safety and independence with all functional transfers   [x] Gait Training to improve gait mechanics, endurance and assess need for appropriate assistive device  [] Stair Training in preparation for safe discharge home and/or into the community   [] Positioning to prevent skin breakdown and contractures  [] Safety and Education Training   [x] Patient/Caregiver Education   [] HEP  [] Other     PT long term treatment goals are located in above grid    Frequency of treatments: 2-5x/week x 1-2 weeks. Time in  14:40  Time out  15:00    Evaluation Time includes thorough review of current medical information, gathering information on past medical history/social history and prior level of function, completion of standardized testing/informal observation of tasks, assessment of data and education on plan of care and goals.     CPT codes:  [x] Low Complexity PT evaluation 28227  [] Moderate Complexity PT evaluation 62090  [] High Complexity PT evaluation 69315  [] PT Re-evaluation 30546  [] Gait training 17417 ** minutes  [] Manual therapy 97244 ** minutes  [] Therapeutic activities 97808 ** minutes  [] Therapeutic exercises 77101 ** minutes  [] Neuromuscular reeducation 19397 ** minutes     Tanner Ernandez ., P.T.   License Number: [id number]

## 2021-08-28 VITALS
SYSTOLIC BLOOD PRESSURE: 160 MMHG | RESPIRATION RATE: 16 BRPM | HEART RATE: 74 BPM | BODY MASS INDEX: 19.76 KG/M2 | TEMPERATURE: 98.1 F | OXYGEN SATURATION: 95 % | HEIGHT: 65 IN | DIASTOLIC BLOOD PRESSURE: 82 MMHG | WEIGHT: 118.6 LBS

## 2021-08-28 LAB
ANION GAP SERPL CALCULATED.3IONS-SCNC: 9 MMOL/L (ref 7–16)
BASOPHILS ABSOLUTE: 0.03 E9/L (ref 0–0.2)
BASOPHILS RELATIVE PERCENT: 0.7 % (ref 0–2)
BUN BLDV-MCNC: 7 MG/DL (ref 6–23)
CALCIUM SERPL-MCNC: 9.1 MG/DL (ref 8.6–10.2)
CHLORIDE BLD-SCNC: 103 MMOL/L (ref 98–107)
CO2: 24 MMOL/L (ref 22–29)
CORTISOL TOTAL: 28.83 MCG/DL (ref 2.68–18.4)
CORTISOL TOTAL: 34.32 MCG/DL (ref 2.68–18.4)
CORTISOL TOTAL: 9.99 MCG/DL (ref 2.68–18.4)
CREAT SERPL-MCNC: 0.7 MG/DL (ref 0.5–1)
EOSINOPHILS ABSOLUTE: 0.07 E9/L (ref 0.05–0.5)
EOSINOPHILS RELATIVE PERCENT: 1.7 % (ref 0–6)
GFR AFRICAN AMERICAN: >60
GFR NON-AFRICAN AMERICAN: >60 ML/MIN/1.73
GLUCOSE BLD-MCNC: 109 MG/DL (ref 74–99)
HCT VFR BLD CALC: 38.2 % (ref 34–48)
HEMOGLOBIN: 12.8 G/DL (ref 11.5–15.5)
IMMATURE GRANULOCYTES #: 0.01 E9/L
IMMATURE GRANULOCYTES %: 0.2 % (ref 0–5)
LYMPHOCYTES ABSOLUTE: 1.07 E9/L (ref 1.5–4)
LYMPHOCYTES RELATIVE PERCENT: 26.5 % (ref 20–42)
MCH RBC QN AUTO: 30.8 PG (ref 26–35)
MCHC RBC AUTO-ENTMCNC: 33.5 % (ref 32–34.5)
MCV RBC AUTO: 92 FL (ref 80–99.9)
MONOCYTES ABSOLUTE: 0.48 E9/L (ref 0.1–0.95)
MONOCYTES RELATIVE PERCENT: 11.9 % (ref 2–12)
NEUTROPHILS ABSOLUTE: 2.38 E9/L (ref 1.8–7.3)
NEUTROPHILS RELATIVE PERCENT: 59 % (ref 43–80)
PDW BLD-RTO: 13.2 FL (ref 11.5–15)
PLATELET # BLD: 137 E9/L (ref 130–450)
PMV BLD AUTO: 10 FL (ref 7–12)
POTASSIUM REFLEX MAGNESIUM: 4.1 MMOL/L (ref 3.5–5)
RBC # BLD: 4.15 E12/L (ref 3.5–5.5)
SODIUM BLD-SCNC: 136 MMOL/L (ref 132–146)
WBC # BLD: 4 E9/L (ref 4.5–11.5)

## 2021-08-28 PROCEDURE — 36415 COLL VENOUS BLD VENIPUNCTURE: CPT

## 2021-08-28 PROCEDURE — 82533 TOTAL CORTISOL: CPT

## 2021-08-28 PROCEDURE — 6360000002 HC RX W HCPCS: Performed by: NURSE PRACTITIONER

## 2021-08-28 PROCEDURE — G0378 HOSPITAL OBSERVATION PER HR: HCPCS

## 2021-08-28 PROCEDURE — 96376 TX/PRO/DX INJ SAME DRUG ADON: CPT

## 2021-08-28 PROCEDURE — 96375 TX/PRO/DX INJ NEW DRUG ADDON: CPT

## 2021-08-28 PROCEDURE — 2500000003 HC RX 250 WO HCPCS: Performed by: FAMILY MEDICINE

## 2021-08-28 PROCEDURE — 2580000003 HC RX 258: Performed by: NURSE PRACTITIONER

## 2021-08-28 PROCEDURE — 6370000000 HC RX 637 (ALT 250 FOR IP): Performed by: NURSE PRACTITIONER

## 2021-08-28 PROCEDURE — 80048 BASIC METABOLIC PNL TOTAL CA: CPT

## 2021-08-28 PROCEDURE — 6360000002 HC RX W HCPCS: Performed by: INTERNAL MEDICINE

## 2021-08-28 PROCEDURE — 85025 COMPLETE CBC W/AUTO DIFF WBC: CPT

## 2021-08-28 PROCEDURE — 96372 THER/PROPH/DIAG INJ SC/IM: CPT

## 2021-08-28 RX ORDER — LISINOPRIL 10 MG/1
10 TABLET ORAL DAILY
Qty: 30 TABLET | Refills: 3 | Status: SHIPPED | OUTPATIENT
Start: 2021-08-29

## 2021-08-28 RX ORDER — LEVOFLOXACIN 250 MG/1
250 TABLET ORAL DAILY
Qty: 7 TABLET | Refills: 0 | Status: SHIPPED | OUTPATIENT
Start: 2021-08-28 | End: 2021-09-04

## 2021-08-28 RX ADMIN — COSYNTROPIN 250 MCG: 0.25 INJECTION, POWDER, LYOPHILIZED, FOR SOLUTION INTRAMUSCULAR; INTRAVENOUS at 08:18

## 2021-08-28 RX ADMIN — ENOXAPARIN SODIUM 40 MG: 40 INJECTION SUBCUTANEOUS at 08:16

## 2021-08-28 RX ADMIN — PANTOPRAZOLE SODIUM 40 MG: 40 TABLET, DELAYED RELEASE ORAL at 05:33

## 2021-08-28 RX ADMIN — SODIUM CHLORIDE, PRESERVATIVE FREE 10 ML: 5 INJECTION INTRAVENOUS at 08:19

## 2021-08-28 RX ADMIN — ATORVASTATIN CALCIUM 40 MG: 40 TABLET, FILM COATED ORAL at 08:16

## 2021-08-28 RX ADMIN — LABETALOL HYDROCHLORIDE 10 MG: 5 INJECTION INTRAVENOUS at 05:24

## 2021-08-28 RX ADMIN — LISINOPRIL 10 MG: 10 TABLET ORAL at 08:16

## 2021-08-28 RX ADMIN — ACETAMINOPHEN 650 MG: 325 TABLET ORAL at 05:33

## 2021-08-28 ASSESSMENT — PAIN SCALES - GENERAL
PAINLEVEL_OUTOF10: 0
PAINLEVEL_OUTOF10: 0
PAINLEVEL_OUTOF10: 6

## 2021-08-28 NOTE — CARE COORDINATION
Social Work discharge planning   Sw spoke to pt/dtr Joey Escobedo  Who said pt lives with her. Lora's son Lucio is driving tp home today. Pt's ed and bath are on 1st floor and she uses ww. Lora and Yamila talked abut pt' PT OT AMPACs and hhc choices. Kelsi said pt will not agree to home PT OT. Joey Escobedo said she will call Dr Arpita Kulkarni office about hc if she changes her mind. No other needs identified now.   Electronically signed by Angelica Au on 8/28/2021 at 12:25 PM

## 2021-08-28 NOTE — DISCHARGE SUMMARY
Physician Discharge Summary     Patient ID:  Tammy Crum  [id number]  80 y.o.  3/13/1927    Admit date: 8/25/2021    Discharge date and time: 8/28/2021    Admission Diagnoses:   Patient Active Problem List   Diagnosis    Elevated troponin    Multiple contusions of trunk, initial encounter    Electrolyte abnormality    Essential hypertension    Mixed hyperlipidemia    Contusion of thorax    Syncope    Gastroesophageal reflux disease without esophagitis    Pneumonia    UTI (urinary tract infection)    Orthostasis    Syncope and collapse       Discharge Diagnoses: Syncope    Consults: Cardiology    Procedures: none    Hospital Course: The patient is a 80 y.o. female of Melvin Alcantar MD with significant past medical history of HTN, HLD, and recurrent falls who presents with syncopal episode. Patient reported that she was sitting in her recliner having breakfast when she suddenly passed out. EKD in ED showed sinus bradycardia with first degree AV block. CXR showed bibasilar opacities consistent with pneumonia versus atelectasis. Cardiology was consulted and plans to follow outpatient with patient to arrange for 72 hour Holter monitor. Patient discharged on levaquin and advised to follow up with PCP and consultants within one week of discharge. Recent Labs     08/26/21  0410 08/27/21  1139 08/28/21  0400   WBC 5.9 4.9 4.0*   HGB 13.0 14.2 12.8   HCT 39.2 43.1 38.2    181 137       Recent Labs     08/26/21  0410 08/27/21  1139 08/28/21  0400    139 136   K 4.2 3.3* 4.1    105 103   CO2 27 25 24   BUN 8 6 7   CREATININE 0.7 0.6 0.7   CALCIUM 9.2 8.4* 9.1       CT HEAD WO CONTRAST    Result Date: 8/25/2021  EXAMINATION: CTA OF THE HEAD WITH CONTRAST; CTA OF THE NECK; CT OF THE HEAD WITHOUT CONTRAST 8/25/2021 3:53 pm: TECHNIQUE: CTA of the head/brain was performed with the administration of intravenous contrast. Multiplanar reformatted images are provided for review.   MIP images are Condition (MA) FINDINGS: CT HEAD: BRAIN/VENTRICLES:  No mass effect, edema or hemorrhage is seen. Mild-to-moderate volume loss and chronic microvascular ischemic changes are seen in the brain. Chronic lacunar infarction is seen in the deep white matter of the inferior left frontal lobe. ORBITS: The visualized portion of the orbits demonstrate no acute abnormality. SINUSES:  The visualized paranasal sinuses and mastoid air cells demonstrate no acute abnormality. SOFT TISSUES/SKULL: No acute abnormality of the visualized skull or soft tissues. CTA NECK: AORTIC ARCH/ARCH VESSELS: No dissection or arterial injury. No significant stenosis of the brachiocephalic or subclavian arteries. CAROTID ARTERIES: No dissection, arterial injury, or hemodynamically significant stenosis by NASCET criteria. The cervical ICAs are moderately tortuous. VERTEBRAL ARTERIES: No dissection, arterial injury, or significant stenosis. SOFT TISSUES: The lung apices are clear. No cervical or superior mediastinal lymphadenopathy. The larynx and pharynx are unremarkable. No acute abnormality of the salivary and thyroid glands. BONES: No acute osseous abnormality. CTA HEAD: ANTERIOR CIRCULATION: No significant stenosis of the intracranial internal carotid, anterior cerebral, or middle cerebral arteries. No aneurysm. POSTERIOR CIRCULATION: No significant stenosis of the vertebral, basilar, or posterior cerebral arteries. No aneurysm. OTHER: No dural venous sinus thrombosis on this non-dedicated study. 1. No acute intracranial abnormality. 2. Unremarkable CTA of the brain and neck. XR CHEST PORTABLE    Result Date: 8/25/2021  EXAMINATION: ONE XRAY VIEW OF THE CHEST 8/25/2021 2:33 pm COMPARISON: July 24, 2018 HISTORY: ORDERING SYSTEM PROVIDED HISTORY: syncope TECHNOLOGIST PROVIDED HISTORY: Reason for exam:->syncope FINDINGS: Opacities are present in the lung bases notable on the right. Large hiatal hernia.   The heart appears normal in size.  No pneumothorax. Bibasilar opacities notable on the right could suggest subsegmental atelectasis or bibasilar pneumonia. Short-term follow-up may be helpful for further evaluation. CTA NECK W CONTRAST    Result Date: 8/25/2021  EXAMINATION: CTA OF THE HEAD WITH CONTRAST; CTA OF THE NECK; CT OF THE HEAD WITHOUT CONTRAST 8/25/2021 3:53 pm: TECHNIQUE: CTA of the head/brain was performed with the administration of intravenous contrast. Multiplanar reformatted images are provided for review. MIP images are provided for review. Dose modulation, iterative reconstruction, and/or weight based adjustment of the mA/kV was utilized to reduce the radiation dose to as low as reasonably achievable.; CTA of the neck was performed with the administration of intravenous contrast. Multiplanar reformatted images are provided for review. MIP images are provided for review. Stenosis of the internal carotid arteries measured using NASCET criteria. Dose modulation, iterative reconstruction, and/or weight based adjustment of the mA/kV was utilized to reduce the radiation dose to as low as reasonably achievable.; CT of the head was performed without the administration of intravenous contrast. Dose modulation, iterative reconstruction, and/or weight based adjustment of the mA/kV was utilized to reduce the radiation dose to as low as reasonably achievable. Noncontrast CT of the head with reconstructed 2-D images are also provided for review. COMPARISON: None. HISTORY: ORDERING SYSTEM PROVIDED HISTORY: syncope, on elquis TECHNOLOGIST PROVIDED HISTORY: Has a \"code stroke\" or \"stroke alert\" been called? ->No Reason for exam:->syncope, on elquis Decision Support Exception - unselect if not a suspected or confirmed emergency medical condition->Emergency Medical Condition ([place]); ORDERING SYSTEM PROVIDED HISTORY: syncope, on eliquis TECHNOLOGIST PROVIDED HISTORY: Has a \"code stroke\" or \"stroke alert\" been called? ->No Reason for exam:->syncope, on eliquis Decision Support Exception - unselect if not a suspected or confirmed emergency medical condition->Emergency Medical Condition (MA); ORDERING SYSTEM PROVIDED HISTORY: HEAD TRAUMA, CLOSED, MILD, GCS >= 13, NO RISK FACTORS, NEURO EXAM NORMAL TECHNOLOGIST PROVIDED HISTORY: Has a \"code stroke\" or \"stroke alert\" been called? ->No Reason for exam:->syncope, possible head injury; on eliquis Decision Support Exception - unselect if not a suspected or confirmed emergency medical condition->Emergency Medical Condition ([place]) FINDINGS: CT HEAD: BRAIN/VENTRICLES:  No mass effect, edema or hemorrhage is seen. Mild-to-moderate volume loss and chronic microvascular ischemic changes are seen in the brain. Chronic lacunar infarction is seen in the deep white matter of the inferior left frontal lobe. ORBITS: The visualized portion of the orbits demonstrate no acute abnormality. SINUSES:  The visualized paranasal sinuses and mastoid air cells demonstrate no acute abnormality. SOFT TISSUES/SKULL: No acute abnormality of the visualized skull or soft tissues. CTA NECK: AORTIC ARCH/ARCH VESSELS: No dissection or arterial injury. No significant stenosis of the brachiocephalic or subclavian arteries. CAROTID ARTERIES: No dissection, arterial injury, or hemodynamically significant stenosis by NASCET criteria. The cervical ICAs are moderately tortuous. VERTEBRAL ARTERIES: No dissection, arterial injury, or significant stenosis. SOFT TISSUES: The lung apices are clear. No cervical or superior mediastinal lymphadenopathy. The larynx and pharynx are unremarkable. No acute abnormality of the salivary and thyroid glands. BONES: No acute osseous abnormality. CTA HEAD: ANTERIOR CIRCULATION: No significant stenosis of the intracranial internal carotid, anterior cerebral, or middle cerebral arteries. No aneurysm. POSTERIOR CIRCULATION: No significant stenosis of the vertebral, basilar, or posterior cerebral arteries.  No aneurysm. OTHER: No dural venous sinus thrombosis on this non-dedicated study. 1. No acute intracranial abnormality. 2. Unremarkable CTA of the brain and neck. CTA HEAD W CONTRAST    Result Date: 8/25/2021  EXAMINATION: CTA OF THE HEAD WITH CONTRAST; CTA OF THE NECK; CT OF THE HEAD WITHOUT CONTRAST 8/25/2021 3:53 pm: TECHNIQUE: CTA of the head/brain was performed with the administration of intravenous contrast. Multiplanar reformatted images are provided for review. MIP images are provided for review. Dose modulation, iterative reconstruction, and/or weight based adjustment of the mA/kV was utilized to reduce the radiation dose to as low as reasonably achievable.; CTA of the neck was performed with the administration of intravenous contrast. Multiplanar reformatted images are provided for review. MIP images are provided for review. Stenosis of the internal carotid arteries measured using NASCET criteria. Dose modulation, iterative reconstruction, and/or weight based adjustment of the mA/kV was utilized to reduce the radiation dose to as low as reasonably achievable.; CT of the head was performed without the administration of intravenous contrast. Dose modulation, iterative reconstruction, and/or weight based adjustment of the mA/kV was utilized to reduce the radiation dose to as low as reasonably achievable. Noncontrast CT of the head with reconstructed 2-D images are also provided for review. COMPARISON: None. HISTORY: ORDERING SYSTEM PROVIDED HISTORY: syncope, on elquis TECHNOLOGIST PROVIDED HISTORY: Has a \"code stroke\" or \"stroke alert\" been called? ->No Reason for exam:->syncope, on elquis Decision Support Exception - unselect if not a suspected or confirmed emergency medical condition->Emergency Medical Condition (MA); ORDERING SYSTEM PROVIDED HISTORY: syncope, on eliquis TECHNOLOGIST PROVIDED HISTORY: Has a \"code stroke\" or \"stroke alert\" been called? ->No Reason for exam:->syncope, on eliquis Decision Support Exception - unselect if not a suspected or confirmed emergency medical condition->Emergency Medical Condition (MA); ORDERING SYSTEM PROVIDED HISTORY: HEAD TRAUMA, CLOSED, MILD, GCS >= 13, NO RISK FACTORS, NEURO EXAM NORMAL TECHNOLOGIST PROVIDED HISTORY: Has a \"code stroke\" or \"stroke alert\" been called? ->No Reason for exam:->syncope, possible head injury; on eliquis Decision Support Exception - unselect if not a suspected or confirmed emergency medical condition->Emergency Medical Condition (MA) FINDINGS: CT HEAD: BRAIN/VENTRICLES:  No mass effect, edema or hemorrhage is seen. Mild-to-moderate volume loss and chronic microvascular ischemic changes are seen in the brain. Chronic lacunar infarction is seen in the deep white matter of the inferior left frontal lobe. ORBITS: The visualized portion of the orbits demonstrate no acute abnormality. SINUSES:  The visualized paranasal sinuses and mastoid air cells demonstrate no acute abnormality. SOFT TISSUES/SKULL: No acute abnormality of the visualized skull or soft tissues. CTA NECK: AORTIC ARCH/ARCH VESSELS: No dissection or arterial injury. No significant stenosis of the brachiocephalic or subclavian arteries. CAROTID ARTERIES: No dissection, arterial injury, or hemodynamically significant stenosis by NASCET criteria. The cervical ICAs are moderately tortuous. VERTEBRAL ARTERIES: No dissection, arterial injury, or significant stenosis. SOFT TISSUES: The lung apices are clear. No cervical or superior mediastinal lymphadenopathy. The larynx and pharynx are unremarkable. No acute abnormality of the salivary and thyroid glands. BONES: No acute osseous abnormality. CTA HEAD: ANTERIOR CIRCULATION: No significant stenosis of the intracranial internal carotid, anterior cerebral, or middle cerebral arteries. No aneurysm. POSTERIOR CIRCULATION: No significant stenosis of the vertebral, basilar, or posterior cerebral arteries. No aneurysm.  OTHER: No dural venous sinus thrombosis on this non-dedicated study. 1. No acute intracranial abnormality. 2. Unremarkable CTA of the brain and neck. Discharge Exam:    HEENT: NCAT,  PERRLA, No JVD  Heart:  RRR, no murmurs, gallops, or rubs. Lungs:  CTA bilaterally, no wheeze, rales or rhonchi  Abd: bowel sounds present, nontender, nondistended, no masses  Extrem:  No clubbing, cyanosis, or edema    Disposition: home     Patient Condition at Discharge: Stable    Patient Instructions:      Medication List      START taking these medications    levoFLOXacin 250 MG tablet  Commonly known as: LEVAQUIN  Take 1 tablet by mouth daily for 7 days        CHANGE how you take these medications    lisinopril 10 MG tablet  Commonly known as: PRINIVIL;ZESTRIL  Take 1 tablet by mouth daily  Start taking on: August 29, 2021  What changed:   · how much to take  · when to take this        CONTINUE taking these medications    Eliquis 2.5 MG Tabs tablet  Generic drug: apixaban     Linzess 145 MCG capsule  Generic drug: linaclotide     omeprazole 20 MG delayed release capsule  Commonly known as: PRILOSEC     simvastatin 40 MG tablet  Commonly known as: ZOCOR        STOP taking these medications    amitriptyline 25 MG tablet  Commonly known as: ELAVIL     estradiol 1 MG tablet  Commonly known as: ESTRACE     gabapentin 300 MG capsule  Commonly known as: NEURONTIN           Where to Get Your Medications      These medications were sent to [facility] - [place], [place] - [address] [place] [id number]    Phone: [phone]   · levoFLOXacin 250 MG tablet  · lisinopril 10 MG tablet       Activity: up as tolerated  Diet: cardiac diet    Pt has been advised to: Follow-up with Sky Parisi MD in 1 week.   Follow-up with consultants as recommended by them    Note that over 30 minutes was spent in preparing discharge papers, discussing discharge with patient, medication review, etc.    Signed:  Laurent Rolon MILAD Maria CNP  8/28/2021  7:20 PM     Above note edited to reflect my thoughts    I personally saw, examined and provided care for the patient. Radiographs, labs and medication list were reviewed by me independently. The case was discussed in detail and plans for care were established. Review of MILAD Ku CNP, documentation was conducted and revisions were made as appropriate directly by me. I agree with the above documented exam, problem list, and plan of care.      Bina Quiñones MD  [phone]

## 2021-08-29 LAB
ORGANISM: ABNORMAL
URINE CULTURE, ROUTINE: ABNORMAL

## 2021-08-30 ENCOUNTER — TELEPHONE (OUTPATIENT)
Dept: CARDIOLOGY CLINIC | Age: 86
End: 2021-08-30

## 2021-08-30 NOTE — TELEPHONE ENCOUNTER
· Daughter recalled she will give the 10mg as  recommend this morning, Latisha Fisher  has taken  no medication yet this morning     · Daughter stated she is frustrated because of dose changes with the lisinopril and request the dose be changed back to 20mg BID. This is stated dose by the daughter Alejandro Hooper,  Lisinopril 20mg BID    · Daughter says her mother is very upset this am due to the reduced lisinopril dose, (daughter is notibly upset on the phone)  She just rechecked Latisha Fisher  bp and now got 192/108  And 202/89. Denies any adverse s/s with this bp. Latisha Fisher daughter Alejandro Hooper did not give am meds yet today. · Instructed to take the am dosing of lisinopril 10mg that is ordered. And Dr Gerardo Green will evaluate daughters above requests. Again denies any other s/s except elevated BP reading this am while patient and daughter upset. · Alejandro Hooper wants an explanation also of why the lisinopril was reduced in the first place. Please let me know what lisinopril dose and any f/u labs and when you want OV (or PCP visit) and why lisinopril was reduced inpatient. Please advise and thank you.       Joaquin Colvin

## 2021-08-30 NOTE — TELEPHONE ENCOUNTER
Pt's daughter Gregg Walsh called for her mother who was discharged from [facility] on 08-28-21 Syncope, bradycardia and 1st degree AVB. Daughter has  concerns that gabapentin and amitriptyline were stopped, and the dose of the lisinopril was changed. On 8-29-21 Daughter  increased the dose of  Lisinopril back to 20 mg on her own  says she took BP \"over 20 times\"    Ranging 139/79                 156/84                  128/76     No other readings given requesting to resume all medications before she was admitted. Also will follow up with PCP but states because of COVID she is not willing to follow with Cardiology office visit.

## 2021-08-30 NOTE — TELEPHONE ENCOUNTER
Pt's daughter Nilo Garcia called for her mother who was discharged from [facility] on 08-28-21 Syncope, bradycardia and 1st degree AVB. Nilo Garcia has questions regarding order for event monitor, concerns that gabapentin and amitriptyline were stopped, and the dose of the lisinopril was changed.   Spoke with Yvonne De La Torre, she will call Nilo Garcia [phone]

## 2021-08-31 NOTE — TELEPHONE ENCOUNTER
Per Dr. Eden Serrano to daughter at this time she declines any cardiology follow up . She will direct all questions and concerns to Dr. John Dove.   Evelynshyanne Wolf feels taking her mom to more than one doctor at this time would not be in her best interest     Office number given with any additional questions or concerns

## 2021-09-05 LAB
Lab: NORMAL
REPORT: NORMAL
THIS TEST SENT TO: NORMAL

## 2021-09-24 PROBLEM — R77.8 ELEVATED TROPONIN: Status: RESOLVED | Noted: 2018-07-24 | Resolved: 2021-09-24

## 2021-09-24 PROBLEM — N39.0 UTI (URINARY TRACT INFECTION): Status: RESOLVED | Noted: 2021-08-25 | Resolved: 2021-09-24

## 2023-03-17 NOTE — DISCHARGE INSTRUCTIONS
Visit Discharge/Physician Orders    Discharge condition: {STABLE/UNSTABLE:042386494}    Assessment of pain at discharge:    Anesthetic used:     Discharge to: {[facility] Wound Discharge To:90062}    Left via:{Left UDP:26425}    Accompanied by: accompanied by {:[id number]}    ECF/HHA:     Dressing Orders:    Treatment Orders:    [facility] followup visit _____________________________  (Please note your next appointment above and if you are unable to keep, kindly give a 24 hour notice. Thank you.)    Physician signature:__________________________      If you experience any of the following, please call the [facility] during business hours:    * Increase in Pain  * Temperature over 101  * Increase in drainage from your wound  * Drainage with a foul odor  * Bleeding  * Increase in swelling  * Need for compression bandage changes due to slippage, breakthrough drainage. If you need medical attention outside of the business hours of the [facility] please contact your PCP or go to the nearest emergency room.

## 2023-03-22 ENCOUNTER — HOSPITAL ENCOUNTER (OUTPATIENT)
Dept: WOUND CARE | Age: 88
Discharge: HOME | End: 2023-03-22